# Patient Record
Sex: FEMALE | Race: WHITE | URBAN - METROPOLITAN AREA
[De-identification: names, ages, dates, MRNs, and addresses within clinical notes are randomized per-mention and may not be internally consistent; named-entity substitution may affect disease eponyms.]

---

## 2017-10-13 ENCOUNTER — OFFICE VISIT (OUTPATIENT)
Dept: ORTHOPEDIC SURGERY | Age: 52
End: 2017-10-13

## 2017-10-13 VITALS
HEART RATE: 90 BPM | BODY MASS INDEX: 33.83 KG/M2 | RESPIRATION RATE: 18 BRPM | HEIGHT: 59 IN | WEIGHT: 167.8 LBS | SYSTOLIC BLOOD PRESSURE: 161 MMHG | DIASTOLIC BLOOD PRESSURE: 109 MMHG

## 2017-10-13 DIAGNOSIS — M54.16 LUMBAR NEURITIS: ICD-10-CM

## 2017-10-13 DIAGNOSIS — M51.26 LUMBAR HERNIATED DISC: Primary | ICD-10-CM

## 2017-10-13 DIAGNOSIS — M51.36 OTHER INTERVERTEBRAL DISC DEGENERATION, LUMBAR REGION: ICD-10-CM

## 2017-10-13 RX ORDER — ALLOPURINOL 300 MG/1
TABLET ORAL
Refills: 6 | COMMUNITY
Start: 2017-09-01

## 2017-10-13 RX ORDER — CYCLOBENZAPRINE HCL 10 MG
TABLET ORAL
Refills: 1 | COMMUNITY
Start: 2017-09-01

## 2017-10-13 RX ORDER — SIMVASTATIN 20 MG/1
TABLET, FILM COATED ORAL
Refills: 4 | COMMUNITY
Start: 2017-09-01

## 2017-10-13 RX ORDER — HYDROCHLOROTHIAZIDE 25 MG/1
TABLET ORAL
Refills: 6 | COMMUNITY
Start: 2017-08-23

## 2017-10-13 RX ORDER — ALBUTEROL SULFATE 108 UG/1
AEROSOL, METERED RESPIRATORY (INHALATION)
Refills: 1 | COMMUNITY
Start: 2017-09-02

## 2017-10-13 RX ORDER — CITALOPRAM 40 MG/1
TABLET, FILM COATED ORAL
Refills: 3 | COMMUNITY
Start: 2017-09-01

## 2017-10-13 RX ORDER — GABAPENTIN 300 MG/1
CAPSULE ORAL
Refills: 1 | COMMUNITY
Start: 2017-09-18

## 2017-10-13 RX ORDER — TOPIRAMATE 25 MG/1
TABLET ORAL
Qty: 90 TAB | Refills: 1 | Status: SHIPPED | OUTPATIENT
Start: 2017-10-13

## 2017-10-13 RX ORDER — ALPRAZOLAM 0.5 MG/1
TABLET ORAL
Refills: 2 | COMMUNITY
Start: 2017-09-01

## 2017-10-13 RX ORDER — DICLOFENAC SODIUM 75 MG/1
TABLET, DELAYED RELEASE ORAL
Refills: 1 | COMMUNITY
Start: 2017-08-21

## 2017-10-13 RX ORDER — HYDROXYZINE PAMOATE 25 MG/1
CAPSULE ORAL
Refills: 0 | COMMUNITY
Start: 2017-08-14

## 2017-10-13 RX ORDER — CLONAZEPAM 1 MG/1
TABLET ORAL
Refills: 0 | COMMUNITY
Start: 2017-09-18

## 2017-10-13 RX ORDER — SUCRALFATE 1 G/1
TABLET ORAL
Refills: 6 | COMMUNITY
Start: 2017-09-06

## 2017-10-13 NOTE — MR AVS SNAPSHOT
Visit Information Date & Time Provider Department Dept. Phone Encounter #  
 10/13/2017  2:30 PM Pankaj Watts MD [facility] - [place] [id number] Follow-up Instructions Return for following MRI, following EMG. Upcoming Health Maintenance Date Due Hepatitis C Screening 1965 DTaP/Tdap/Td series (1 - Tdap) 10/26/1985 PAP AKA CERVICAL CYTOLOGY 10/26/1985 BREAST CANCER SCRN MAMMOGRAM 10/26/2014 FOBT Q 1 YEAR AGE 50-75 10/26/2014 INFLUENZA AGE 9 TO ADULT 8/1/2017 Allergies as of 10/13/2017  Review Complete On: 10/13/2017 By: Pankaj Watts MD  
 No Known Allergies Current Immunizations  Never Reviewed No immunizations on file. Not reviewed this visit You Were Diagnosed With   
  
 Codes Comments Lumbar herniated disc    -  Primary ICD-10-CM: M51.26 
ICD-9-CM: 722.10 Lumbar neuritis     ICD-10-CM: M54.16 
ICD-9-CM: 724.4 Other intervertebral disc degeneration, lumbar region     ICD-10-CM: M51.36 
ICD-9-CM: 722.52 Vitals BP Pulse Resp Height(growth percentile) Weight(growth percentile) BMI  
 (!) 161/109 (BP 1 Location: Right arm, BP Patient Position: Sitting) 90 18 4' 11\" (1.499 m) 167 lb 12.8 oz (76.1 kg) 33.89 kg/m2 Smoking Status Current Every Day Smoker Vitals History BMI and BSA Data Body Mass Index Body Surface Area  
 33.89 kg/m 2 1.78 m 2 Preferred Pharmacy Pharmacy Name Phone [facility] [address] [phone] Your Updated Medication List  
  
   
This list is accurate as of: 10/13/17  4:21 PM.  Always use your most recent med list.  
  
  
  
  
 allopurinol 300 mg tablet Commonly known as:  Ollen Epley TK 1 T PO QD  
  
 ALPRAZolam 0.5 mg tablet Commonly known as:  XANAX  
TAKE 1 TO 2 T PO TID PRN FOR 30 DAYS citalopram 40 mg tablet Commonly known as:  Lashon Mutters TK 1 T PO QD  
  
 clonazePAM 1 mg tablet Commonly known as:  Rhetta Napoleon TK 1 T PO Q 8 H PRN  
  
 cyclobenzaprine 10 mg tablet Commonly known as:  FLEXERIL TK 1 T PO Q 8 H PRF BACK PAIN  
  
 diclofenac EC 75 mg EC tablet Commonly known as:  VOLTAREN  
TK 1 T PO BID  
  
 gabapentin 300 mg capsule Commonly known as:  NEURONTIN  
TK 7 CS PO DAILY FOR 30 DAYS  
  
 hydroCHLOROthiazide 25 mg tablet Commonly known as:  HYDRODIURIL  
TAKE 1 T PO QD  
  
 hydrOXYzine pamoate 25 mg capsule Commonly known as:  VISTARIL TK 1 C PO Q 12 H PRF ANXIETY FOR 30 DAYS  
  
 PROVENTIL HFA 90 mcg/actuation inhaler Generic drug:  albuterol INHALE 2 PUFFS PO QID PRN  
  
 simvastatin 20 mg tablet Commonly known as:  ZOCOR TK 1 T PO QHS  
  
 sucralfate 1 gram tablet Commonly known as:  CARAFATE  
TAKE 1 T PO QID BEFORE MEALS AND AT BEDTIME  
  
 topiramate 25 mg tablet Commonly known as:  TOPAMAX 3 tabs PO QHS Prescriptions Sent to Pharmacy Refills  
 topiramate (TOPAMAX) 25 mg tablet 1 Sig: 3 tabs PO QHS Class: Normal  
 Pharmacy: [address] [place] [address], [address] [place] [address] #: [phone] Follow-up Instructions Return for following MRI, following EMG. To-Do List   
 10/20/2017 Neurology:  EMG TWO EXTREMITIES LOWER   
  
 10/20/2017 Imaging:  MRI LUMB SPINE WO CONT Introducing [facility]! Neida Chaudhary introduces Unbounce patient portal. Now you can access parts of your medical record, email your doctor's office, and request medication refills online. 1. In your internet browser, go to [email or web address] 2. Click on the First Time User? Click Here link in the Sign In box. You will see the New Member Sign Up page. 3. Enter your Unbounce Access Code exactly as it appears below.  You will not need to use this code after youve completed the sign-up process. If you do not sign up before the expiration date, you must request a new code. · [email or web address] Access Code: [facility] Expires: 12/13/2017  2:30 PM 
 
4. Enter the last four digits of your Social Security Number (xxxx) and Date of Birth (mm/dd/yyyy) as indicated and click Submit. You will be taken to the next sign-up page. 5. Create a Scoop.it ID. This will be your Scoop.it login ID and cannot be changed, so think of one that is secure and easy to remember. 6. Create a Scoop.it password. You can change your password at any time. 7. Enter your Password Reset Question and Answer. This can be used at a later time if you forget your password. 8. Enter your e-mail address. You will receive e-mail notification when new information is available in [address]. 9. Click Sign Up. You can now view and download portions of your medical record. 10. Click the Download Summary menu link to download a portable copy of your medical information. If you have questions, please visit the Frequently Asked Questions section of the [email or web address] website. Remember, Scoop.it is NOT to be used for urgent needs. For medical emergencies, dial 911. Now available from your iPhone and Android! Please provide this summary of care documentation to your next provider. Your primary care clinician is listed as Efrain Ta. If you have any questions after today's visit, please call [phone].

## 2017-10-13 NOTE — PROGRESS NOTES
[facility]  [address]  [address], [place]   Phone: [phone]  Fax: [phone]        INITIAL CONSULTATION      HISTORY OF PRESENT ILLNESS:  Dayna Gloria is a 45 y.o. female whom is referred from [facility], NP secondary to chronic progressive low back pain extending into the BLE (R=L) anteriorly to the feet, involving all digits with paraesthesias ongoing x 5+ years. Extremity symptoms are greater than low back pain. No specific injury/trauma. Pt reports bilateral foot drop and has had falls over the past six months. She rates pain 8/10. Note from [facility] dated 7/5/15 indicates patient was on Methadone. Note from Dr. Nisreen Petty DO for [facility] dated 5/4/15 indicates patient had tried injection therapy and medications x 6 months without benefit. Of note, he felt patient would be a good candidate for SCS trial. On 6/10/16, she underwent caudal block. Note from Dr. Nisreen Petty DO for [facility] dated 9/16/16 indicating patient underwent bilateral L5 SNRBs (provided relief x 3 weeks). Of note, she was on Fentanyl, Percocet, Neurontin and Flexeril. Reviewing records from [facility], it appears as though the patient underwent more than three lumbar blocks in a 12-month period. Pt previously took Lyrica without benefit. Pt is currently taking Neurontin 300 mg QID. She notes she is intolerant to higher doses of Neurontin secondary to BUE/BLE edema. Pt is not a candidate for Cymbalta due to other medications she is currently prescribed. Pt denies h/o lumbar spinal surgery. Per patient, a Dr. Lynsey Florez told her she was not a surgical candidate. Pt denies h/o DM or alcoholism. Lumbar spine XR dated 1/19/15 per report revealed degenerated L3-4 and L4-5 discs, with slight progression at L5-S1. Lower lumbar facet arthropathy with progression and with interval development of minimal grade 1 anterolisthesis at L4-5 on a degenerative basis. Lumbar spine MRI dated 8/20/16 reviewed.  Per report, minimal disc bulge and spondylosis L4-5, L5-S1. Minimal grade 1 degenerative spondylolisthesis, L4-5. Moderate asymmetric left L5-S1 foraminal stenosis from eccentric spondylosis and facet joint osteoarthritis with impingement on the left exiting L5 nerve root. Mild L4-5 central and mild right foraminal stenosis but no evident exiting L4 root impingement. Moderate bilateral L4-5 facet joint osteoarthritis with associated mild bilateral facet synovitis. Minimal L3-4, L4-5. Bastrup's disease/interspinous ligament degeneration and/or bursitis. The patient is RHD.  reviewed. Past Medical History:   Diagnosis Date    Asthma     Cancer [facility]) 2015    cervical cancer    Hypertension           Past Surgical History:   Procedure Laterality Date    HX GYN  2015    Cervical Cancer Removal       Social History   Substance Use Topics    Smoking status: Not on file    Smokeless tobacco: Not on file    Alcohol use Not on file     Work status: Not available. Marital status: The patient is legally . No Known Allergies     Family History   Problem Relation Age of Onset    No Known Problems Mother     Cancer Father      lymph node cancer    Hypertension Father          REVIEW OF SYSTEMS  Constitutional symptoms: Negative  Eyes: Negative  Ears, Nose, Throat, and Mouth: Negative  Cardiovascular: Negative  Respiratory: Negative  Genitourinary: Negative  Integumentary (Skin and/or breast): Negative  Musculoskeletal: Positive for low back pain into the BLE. Extremities: Negative for edema.   Endocrine/Rheumatologic: Negative  Hematologic/Lymphatic: Negative  Allergic/Immunologic: Negative  Psychiatric: Negative       PHYSICAL EXAMINATION    Visit Vitals    BP (!) 161/109 (BP 1 Location: Right arm, BP Patient Position: Sitting)    Pulse 90    Resp 18    Ht 4' 11\" (1.499 m)    Wt 167 lb 12.8 oz (76.1 kg)    BMI 33.89 kg/m2       CONSTITUTIONAL: NAD, A&O x 3  HEART: Regular rate and rhythm  ABDOMEN: Positive bowel sounds, soft, nontender, and nondistended  LUNGS: Clear to auscultation bilaterally. SKIN: No rashes noted. RANGE OF MOTION: The patient has full passive range of motion in all four extremities. SENSATION: Decreased sensation to light touch at left lateral thigh. Sensation to light touch otherwise intact. MOTOR:   Straight Leg Raise: Negative, bilateral  Bay: Negative, bilateral  Deep tendon reflexes are 2+ at the brachioradialis, biceps, and triceps. Deep tendon reflexes are 2+ at the knees and 0 at the ankles bilaterally. Shoulder AB/Flex Elbow Flex Wrist Ext Elbow Ext Wrist Flex Hand Intrin Tone   Right +4/5 +4/5 +4/5 +4/5 +4/5 +4/5 +4/5   Left +4/5 +4/5 +4/5 +4/5 +4/5 +4/5 +4/5              Hip Flex Knee Ext Knee Flex Ankle DF GTE Ankle PF Tone   Right +4/5 +4/5 +4/5 +4/5 +4/5 +4/5 +4/5   Left +4/5 +4/5 +4/5 +4/5 +4/5 +4/5 +4/5       ASSESSMENT   Diagnoses and all orders for this visit:    1. Lumbar herniated disc    2. Lumbar neuritis    3. Other intervertebral disc degeneration, lumbar region         IMPRESSIONS/RECOMMENDATIONS:  The patient presents for chronic progressive low back pain extending into the BLE. She notes she has had several falls over the past six months and describes bilateral footdrop. I will set her up for a new lumbar spine MRI. I advised patient to bring copies of films to next visit. I will also refer her to Dr. Duong Ward for an EMG of the BLE to r/o radiculopathy versus neuropathy. She will wean off Neurontin 300 mg QID. I will try her on Topamax 25 mg 3 tabs qhs ramped. The risks, benefits, and potential side effects of this medication were discussed. Patient understands and wishes to proceed. Patient advised to call the office if intolerant to new medication. I will see the patient back following diagnostic testing. Written by Ted Csoby, as dictated by Gómez Echevarria MD  I examined the patient, reviewed and agree with the note.

## 2017-10-17 ENCOUNTER — DOCUMENTATION ONLY (OUTPATIENT)
Dept: ORTHOPEDIC SURGERY | Age: 52
End: 2017-10-17

## 2017-10-17 NOTE — PROGRESS NOTES
EMG BLE is scheduled with Dr. Wright Lefort, [address] [place] [address], [address], [facility], [phone] on 10/27/17, arrive 11:00AM, test 11:30AM. No Medicare pre-authorization required. I have tried several times to reach MsMelva Huber Valverde but her voice mail is not set up. I have mailed the referral to MsMelva Huber Valverde with the above appointment information.

## 2017-10-18 ENCOUNTER — DOCUMENTATION ONLY (OUTPATIENT)
Dept: ORTHOPEDIC SURGERY | Age: 52
End: 2017-10-18

## 2017-12-11 ENCOUNTER — TELEPHONE (OUTPATIENT)
Dept: ORTHOPEDIC SURGERY | Age: 52
End: 2017-12-11

## 2017-12-11 DIAGNOSIS — M51.36 OTHER INTERVERTEBRAL DISC DEGENERATION, LUMBAR REGION: Primary | ICD-10-CM

## 2017-12-11 DIAGNOSIS — M51.26 LUMBAR HERNIATED DISC: ICD-10-CM

## 2017-12-11 DIAGNOSIS — M54.16 LUMBAR NEURITIS: ICD-10-CM

## 2017-12-11 NOTE — TELEPHONE ENCOUNTER
I will set her up for a new lumbar spine MRI. I advised patient to bring copies of films to next visit. I will also refer her to Dr. Raymond Lucero for an EMG of the BLE to r/o radiculopathy versus neuropathy. I will see the patient back following diagnostic testing.      Has she had the studies? If so, please bring a copy of the disc. She has failed Gabapentin and Topamax. He will discuss further medications at her FU apt.

## 2017-12-11 NOTE — TELEPHONE ENCOUNTER
Called and inquired about her side effects and patient states she has since stopped taking the Topamax and she last took it before the weekend. She states the side effects are still there just not as bad. Patient states she has muscle soreness due to the jerking from the medication. Patient states this has kicked her anxiety more into gear. Patient has had her EMG done and due to transportation but she is scheduled for her MRI on Thursday 12/14/17. Patient is inquiring if there is anything else she can be put on? Please advise.

## 2017-12-11 NOTE — TELEPHONE ENCOUNTER
Patient had back reaction to Topamax which started talking in sleep and jerking in sleep about about a week after the 4th. She has felt like she was falling off leland. Having halliculation also. On Mon Nov 27,2017 she started with severe nausea, diarrhea. She has Hx of Diverticulosis She decreased dosage and has stoppped it all together but still has slight jerking at night and some time in day. Her muscles still feel sore & weak especially in shlds. She cannot take this medication. Need for Dr. Rhoda Wagner to prescribe a different medication.   Please call patient back at [phone]

## 2017-12-12 NOTE — TELEPHONE ENCOUNTER
She is not a candidate for Cymbalta. We can try a low dose of Lyrica. 50 mg BID. Please see if she would like to try this.      Lyrica 50 mg BID, #60, 0 RF

## 2017-12-12 NOTE — TELEPHONE ENCOUNTER
Called and informed patient per the provider she is not a candidate for Cymbalta but we can try her on a low dose of Lyrica 50 mg BID. Patient states yes she is willing to try the Lyrica. I informed her that we will be doing a free trial of the medication and she would need to  a free trial card and her RX. I informed patient that the free trial would state take 1 pill TID but she needs to take 1 pill at night for 7 days and thereafter take 1 pill twice a day. Patient states she will not be able to pick the RX today but possible tomorrow because her vehicle is currently not working. I informed patient that RX would be ready for  whenever she was able to get to our office. I also informed patient that due to her insurance we may have to do a prior authorization. Patient verbalized understanding.

## 2017-12-13 RX ORDER — PREGABALIN 50 MG/1
50 CAPSULE ORAL 3 TIMES DAILY
Qty: 21 CAP | Refills: 0 | Status: SHIPPED | OUTPATIENT
Start: 2017-12-13 | End: 2018-02-20 | Stop reason: ALTCHOICE

## 2017-12-13 RX ORDER — PREGABALIN 50 MG/1
50 CAPSULE ORAL 2 TIMES DAILY
Qty: 60 CAP | Refills: 0 | Status: SHIPPED | OUTPATIENT
Start: 2017-12-13 | End: 2018-01-17 | Stop reason: SDUPTHER

## 2017-12-19 DIAGNOSIS — M54.16 LUMBAR NEURITIS: ICD-10-CM

## 2017-12-19 DIAGNOSIS — M51.26 LUMBAR HERNIATED DISC: ICD-10-CM

## 2017-12-19 DIAGNOSIS — M51.36 OTHER INTERVERTEBRAL DISC DEGENERATION, LUMBAR REGION: ICD-10-CM

## 2018-01-17 DIAGNOSIS — M54.16 LUMBAR NEURITIS: ICD-10-CM

## 2018-01-17 DIAGNOSIS — M51.36 OTHER INTERVERTEBRAL DISC DEGENERATION, LUMBAR REGION: ICD-10-CM

## 2018-01-17 DIAGNOSIS — M51.26 LUMBAR HERNIATED DISC: ICD-10-CM

## 2018-01-17 RX ORDER — PREGABALIN 50 MG/1
50 CAPSULE ORAL 2 TIMES DAILY
Qty: 60 CAP | Refills: 0 | OUTPATIENT
Start: 2018-01-17

## 2018-01-17 NOTE — TELEPHONE ENCOUNTER
PHONE IN RX    Last Visit: 10/13/2017 with MD Janie Guerrier    Next Appointment: 01/26/2018 with MD Janie Guerrier; Pt cancele 01/03  Previous Refill Encounters: 12/13/2017 per NP [place] #[id number]    Requested Prescriptions     Pending Prescriptions Disp Refills    pregabalin (LYRICA) 50 mg capsule 60 Cap 0     Sig: Take 1 Cap by mouth two (2) times a day. Max Daily Amount: 100 mg.

## 2018-01-22 NOTE — TELEPHONE ENCOUNTER
Medication has been called into the pharmacy. Called to inform the patient but did not get an answer. Left a VM stating that the medication has called in.

## 2018-02-20 ENCOUNTER — OFFICE VISIT (OUTPATIENT)
Dept: ORTHOPEDIC SURGERY | Age: 53
End: 2018-02-20

## 2018-02-20 VITALS
HEART RATE: 68 BPM | WEIGHT: 178 LBS | BODY MASS INDEX: 35.88 KG/M2 | HEIGHT: 59 IN | DIASTOLIC BLOOD PRESSURE: 96 MMHG | RESPIRATION RATE: 16 BRPM | SYSTOLIC BLOOD PRESSURE: 139 MMHG

## 2018-02-20 DIAGNOSIS — M54.16 LUMBAR RADICULOPATHY: ICD-10-CM

## 2018-02-20 DIAGNOSIS — M51.36 OTHER INTERVERTEBRAL DISC DEGENERATION, LUMBAR REGION: Primary | ICD-10-CM

## 2018-02-20 DIAGNOSIS — M51.26 LUMBAR HERNIATED DISC: Primary | ICD-10-CM

## 2018-02-20 RX ORDER — DIAZEPAM 10 MG/1
TABLET ORAL
Qty: 1 TAB | Refills: 0 | OUTPATIENT
Start: 2018-02-20 | End: 2018-05-22 | Stop reason: ALTCHOICE

## 2018-02-20 RX ORDER — PREGABALIN 75 MG/1
75 CAPSULE ORAL 2 TIMES DAILY
Qty: 60 CAP | Refills: 1 | Status: SHIPPED | OUTPATIENT
Start: 2018-02-20

## 2018-02-20 NOTE — PROGRESS NOTES
[facility]  [address], [place]   Phone: [phone]  Fax: [phone]        PROGRESS NOTE      HISTORY OF PRESENT ILLNESS:  The patient is a 47 y.o. female and was seen today for follow up of chronic progressive low back pain extending into the BLE (R=L) anteriorly to the feet, involving all digits with paraesthesias ongoing x 5+ years. Extremity symptoms are greater than low back pain. No specific injury/trauma. Pt reports bilateral foot drop and has had falls over the past six months. Note from [facility] dated 7/5/15 indicates patient was on Methadone. Note from Dr. Abilio Arana DO for [facility] dated 5/4/15 indicates patient had tried injection therapy and medications x 6 months without benefit. Of note, he felt patient would be a good candidate for SCS trial. On 6/10/16, she underwent caudal block. Note from Dr. Abilio Arana DO for [facility] dated 9/16/16 indicating patient underwent bilateral L5 SNRBs (provided relief x 3 weeks). Of note, she was on Fentanyl, Percocet, Neurontin and Flexeril. Reviewing records from [facility], it appears as though the patient underwent more than three lumbar blocks in a 12-month period. Pt previously took Lyrica without benefit. Pt is currently taking Neurontin 300 mg QID. She notes she is intolerant to higher doses of Neurontin secondary to BUE/BLE edema. Pt is not a candidate for Cymbalta due to other medications she is currently prescribed. Pt denies h/o lumbar spinal surgery. Per patient, a Dr. More Call told her she was not a surgical candidate. Pt denies h/o DM or alcoholism. Lumbar spine XR dated 1/19/15 per report revealed degenerated L3-4 and L4-5 discs, with slight progression at L5-S1. Lower lumbar facet arthropathy with progression and with interval development of minimal grade 1 anterolisthesis at L4-5 on a degenerative basis. Lumbar spine MRI dated 8/20/16 reviewed. Per report, minimal disc bulge and spondylosis L4-5, L5-S1. Minimal grade 1 degenerative spondylolisthesis, L4-5. Moderate asymmetric left L5-S1 foraminal stenosis from eccentric spondylosis and facet joint osteoarthritis with impingement on the left exiting L5 nerve root. Mild L4-5 central and mild right foraminal stenosis but no evident exiting L4 root impingement. Moderate bilateral L4-5 facet joint osteoarthritis with associated mild bilateral facet synovitis. Minimal L3-4, L4-5. Bastrup's disease/interspinous ligament degeneration and/or bursitis. The patient is RHD. At her last clinic appointment, the patient presented for chronic progressive low back pain extending into the BLE. She noted she has had several falls over the past six months and described bilateral foot drop. She was set up for a new lumbar spine MRI. I advised patient to bring copies of films to next visit. I referred her to Dr. Megan Grace for an EMG of the BLE to r/o radiculopathy versus neuropathy. She weaned off Neurontin 300 mg QID. I tried her on Topamax 25 mg 3 tabs qhs ramped. I will see the patient back following diagnostic testing. The patient returns today with low back pain extending into the BLE circumferentially to the feet, with pain mostly localized on her bilateral knees (R=L). She rates pain 10/10, an increase since her last visit (8/10). Per our records, pt did not tolerate TOPAMAX and subsequently she d/c the medication and was started on Lyrica 50 mg BID on 12/12/17, which she tolerates well with some benefit but she stopped taking it. Her last block was performed roughly 1.5 years ago by Dr. Jaci Garrison. BLE EMG dated 10/27/17 reviewed. Report was essentially within the normal limits. Lumbar spine MRI dated 2/13/18 reviewed. Per report, stable MRI of the lumbar spine since prior ecam 8-2016. Mild L4-5 central stenosis, from grade 1 degenerative spondylolisthesis with moderate bilateral facet joint ostearthritis and ligamentum flavlyn thickening, minimal disc bulge.  Eccentric and moderate right L5-S1 foraminal stenosis from minimal disc bulge with eccentric left lateral spondylosis and mild asymmetric right facet joint ostearthritis. Associated impingement on the left existing L5 nerve root. Minimal L4-5 and L5-S1 disc bulges. Multilevel lower lumbar facet joint ostearthritis as above. Finding consistent with mild L3-4, L4-5 Bartrup's disease/interspinous ligament degenerative and or bursitis.  reviewed. Body mass index is 35.95 kg/(m^2). Past Medical History:   Diagnosis Date    Asthma     Cancer [facility]) 2015    cervical cancer    Hypertension         Social History     Social History    Marital status: LEGALLY      Spouse name: N/A    Number of children: N/A    Years of education: N/A     Occupational History    Not on file. Social History Main Topics    Smoking status: Current Every Day Smoker     Packs/day: 1.00    Smokeless tobacco: Never Used    Alcohol use No    Drug use: Not on file    Sexual activity: Not on file     Other Topics Concern    Not on file     Social History Narrative       Current Outpatient Prescriptions   Medication Sig Dispense Refill    pregabalin (LYRICA) 75 mg capsule Take 1 Cap by mouth two (2) times a day. Max Daily Amount: 150 mg. 60 Cap 1    pregabalin (LYRICA) 50 mg capsule Take 1 Cap by mouth two (2) times a day.  Max Daily Amount: 100 mg. 60 Cap 0    PROVENTIL HFA 90 mcg/actuation inhaler INHALE 2 PUFFS PO QID PRN  1    allopurinol (ZYLOPRIM) 300 mg tablet TK 1 T PO QD  6    ALPRAZolam (XANAX) 0.5 mg tablet TAKE 1 TO 2 T PO TID PRN FOR 30 DAYS  2    citalopram (CELEXA) 40 mg tablet TK 1 T PO QD  3    cyclobenzaprine (FLEXERIL) 10 mg tablet TK 1 T PO Q 8 H PRF BACK PAIN  1    hydroCHLOROthiazide (HYDRODIURIL) 25 mg tablet TAKE 1 T PO QD  6    simvastatin (ZOCOR) 20 mg tablet TK 1 T PO QHS  4    diazePAM (VALIUM) 10 mg tablet Take 1 tab by mouth as directed by nurse prior to procedure 1 Tab 0    clonazePAM (KLONOPIN) 1 mg tablet TK 1 T PO Q 8 H PRN  0    diclofenac EC (VOLTAREN) 75 mg EC tablet TK 1 T PO BID  1    gabapentin (NEURONTIN) 300 mg capsule TK 7 CS PO DAILY FOR 30 DAYS  1    hydrOXYzine pamoate (VISTARIL) 25 mg capsule TK 1 C PO Q 12 H PRF ANXIETY FOR 30 DAYS  0    sucralfate (CARAFATE) 1 gram tablet TAKE 1 T PO QID BEFORE MEALS AND AT BEDTIME  6    topiramate (TOPAMAX) 25 mg tablet 3 tabs PO QHS (Patient not taking: Reported on 2/20/2018) 90 Tab 1       No Known Allergies       PHYSICAL EXAMINATION    Visit Vitals    BP (!) 139/96    Pulse 68    Resp 16    Ht 4' 11\" (1.499 m)    Wt 178 lb (80.7 kg)    BMI 35.95 kg/m2       CONSTITUTIONAL: NAD, A&O x 3  SENSATION: Decreased sensation to light touch bellow the knee posteriorly in the LLE in an area just bellow the ankle. Sensation to light touch otherwise intact. RANGE OF MOTION: The patient has full passive range of motion in all four extremities. MOTOR:  Straight Leg Raise: Negative, bilateral     Required couching to get maximum effort. Hip Flex Knee Ext Knee Flex Ankle DF GTE Ankle PF Tone   Right +4/5 +4/5 +4/5 +4/5 +4/5 +4/5 +4/5   Left +4/5 +4/5 +4/5 +4/5 +4/5 +4/5 +4/5       ASSESSMENT   Diagnoses and all orders for this visit:    1. Other intervertebral disc degeneration, lumbar region  -     REFERRAL TO ORTHOPEDICS  -     SCHEDULE SURGERY  -     pregabalin (LYRICA) 75 mg capsule; Take 1 Cap by mouth two (2) times a day. Max Daily Amount: 150 mg.    2. Lumbar radiculopathy  -     REFERRAL TO ORTHOPEDICS  -     SCHEDULE SURGERY  -     pregabalin (LYRICA) 75 mg capsule; Take 1 Cap by mouth two (2) times a day. Max Daily Amount: 150 mg.          IMPRESSION AND PLAN:  Patient is neurologically intact. I will try her on Lyrica 75 mg BID. The risks, benefits, and potential side effects of this medication were discussed. Patient understands and wishes to proceed. Patient advised to call the office if intolerant to new medication. I will refer her to see Dr. Heron Lagos to evaluate her bilateral knee complaints. I will order bilateral L4/5 facet joint injection. I will see the patient back following block    Written by Yoel Berg, as dictated by Maura Tobar MD  I examined the patient, reviewed and agree with the note.

## 2018-02-23 ENCOUNTER — TELEPHONE (OUTPATIENT)
Dept: ORTHOPEDIC SURGERY | Age: 53
End: 2018-02-23

## 2018-02-23 NOTE — TELEPHONE ENCOUNTER
Called [phone] and was unable to leave a voice mail due to it saying Jimmy Founds and they are not on her [de-identified]. I was calling to inform her of the message below per the provider.

## 2018-02-23 NOTE — TELEPHONE ENCOUNTER
Called and informed patient per the provider of the below message and she stated she has had to do a lot more walking because her car is broke down and with all the walking she is in a lot of pain. I again repeated that per the provider Dr. Dorina Boateng had just increased her medication a few days ago and it takes longer than than a week to become effective. And we have to make sure she can tolerate the dose prior to increasing. Patient is scheduled for her injection on 3/6/18 as long as here ride comes through. I informed patient that she could take OTC Aleve or Motrin to see if this would help calm the pain. Patient verbalized understanding.

## 2018-02-23 NOTE — TELEPHONE ENCOUNTER
He just up'd the medication a few days ago. The medication may take a little longer ( a week or so) to be come effective. We want to make sure she tolerates the dose prior to increasing it. Please make sure the block has been scheduled. She is not a candidate for Cymbalta. I would recommend OTC Aleve or Motrin to see if this will help calm the pain.
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
## 2018-03-08 DIAGNOSIS — M51.36 OTHER INTERVERTEBRAL DISC DEGENERATION, LUMBAR REGION: ICD-10-CM

## 2018-03-08 DIAGNOSIS — M51.26 LUMBAR HERNIATED DISC: ICD-10-CM

## 2018-03-08 DIAGNOSIS — M54.16 LUMBAR NEURITIS: ICD-10-CM

## 2018-03-26 ENCOUNTER — TELEPHONE (OUTPATIENT)
Dept: ORTHOPEDIC SURGERY | Age: 53
End: 2018-03-26
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
## 2018-03-26 NOTE — TELEPHONE ENCOUNTER
She was scheduled for an injection on 3/6. I do not see this in CC. If she missed this, is she ready to have it rescheduled? Please ensure the pain distribution has not changed. She was scheduled for a L4/5 bilateral facet joint injection.

## 2018-03-27 NOTE — TELEPHONE ENCOUNTER
Called and left voice message asking patient to call our office back to inquire per the provider in the below message. To verify where the patient is having pain.

## 2018-03-29 NOTE — TELEPHONE ENCOUNTER
She has now failed Cymbalta, Lyrica, Topamax and Gabapentin. Gabitril will likely be just as expensive. I would recommend she reschedule the block.
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
## 2018-03-30 NOTE — TELEPHONE ENCOUNTER
Called and informed patient of the below message and she states she would like to try the Gabitril because she does not feel the Lyrica is helping and it is expensive. Patient states she has 5 more days left and she will get the rest before she runs out. Patient states she is thinking about not getting the injection because she has had 12 injections and they only last 2 weeks. Patient states she uses [facility]. I did inform patient that she would have to wean off the Lyrica first before she starts the new medication. Please advise.

## 2018-04-02 NOTE — TELEPHONE ENCOUNTER
Attempted to call back. .. Did not leave message. VM has different persons name on it. Will attempt 2nd try tomorrow. 2nd attempt made. Same result. 3rd attempt made. Same result. Closing message. Pt has no f/u scheduled with NLP.

## 2018-04-02 NOTE — TELEPHONE ENCOUNTER
After reviewing the chart, She no showed in March. Please have her make a FU (can be with NP). Please give her instructions to wean off Lyrica. She is taking this BID now. She should cut down to daily for 4-5 days and then stop. We can discuss the next medication (which is likely Gabitril) at the follow up.

## 2018-04-10 ENCOUNTER — OFFICE VISIT (OUTPATIENT)
Dept: ORTHOPEDIC SURGERY | Age: 53
End: 2018-04-10

## 2018-04-10 VITALS
TEMPERATURE: 98 F | WEIGHT: 170 LBS | SYSTOLIC BLOOD PRESSURE: 145 MMHG | BODY MASS INDEX: 34.27 KG/M2 | HEIGHT: 59 IN | OXYGEN SATURATION: 98 % | DIASTOLIC BLOOD PRESSURE: 98 MMHG | HEART RATE: 75 BPM

## 2018-04-10 DIAGNOSIS — M53.3 COCCYDYNIA: ICD-10-CM

## 2018-04-10 DIAGNOSIS — M25.562 PAIN IN BOTH KNEES, UNSPECIFIED CHRONICITY: Primary | ICD-10-CM

## 2018-04-10 DIAGNOSIS — M53.3 PAIN, COCCYX: ICD-10-CM

## 2018-04-10 DIAGNOSIS — M21.371 FOOT DROP, RIGHT: ICD-10-CM

## 2018-04-10 DIAGNOSIS — M25.561 PAIN IN BOTH KNEES, UNSPECIFIED CHRONICITY: Primary | ICD-10-CM

## 2018-04-10 DIAGNOSIS — M17.0 ARTHRITIS OF BOTH KNEES: ICD-10-CM

## 2018-04-10 NOTE — MR AVS SNAPSHOT
[facility] 
 
 
 [address] [facility] 
[phone] Patient: Zulma Waldron MRN: [id number] :1965 Visit Information Date & Time Provider Department Dept. Phone Encounter #  
 4/10/2018  2:00 PM Yue Malin  [facility], [address] and [facility] [place] [address] [phone] [id number] Upcoming Health Maintenance Date Due Hepatitis C Screening 1965 Pneumococcal 19-64 Medium Risk (1 of 1 - PPSV23) 10/26/1983 DTaP/Tdap/Td series (1 - Tdap) 10/26/1985 PAP AKA CERVICAL CYTOLOGY 10/26/1985 BREAST CANCER SCRN MAMMOGRAM 10/26/2014 FOBT Q 1 YEAR AGE 50-75 10/26/2014 Influenza Age 5 to Adult 2017 MEDICARE YEARLY EXAM 3/14/2018 Allergies as of 4/10/2018  Review Complete On: 4/10/2018 By: Rosetta Jackson LPN No Known Allergies Current Immunizations  Never Reviewed No immunizations on file. Not reviewed this visit You Were Diagnosed With   
  
 Codes Comments Pain in both knees, unspecified chronicity    -  Primary ICD-10-CM: M25.561, M25.562 ICD-9-CM: 719.46 Pain, coccyx     ICD-10-CM: M53.3 ICD-9-CM: 724.79 Vitals BP Pulse Temp Height(growth percentile) Weight(growth percentile) SpO2  
 (!) 145/98 75 98 °F (36.7 °C) (Oral) 4' 11\" (1.499 m) 170 lb (77.1 kg) 98% BMI Smoking Status 34.34 kg/m2 Current Every Day Smoker BMI and BSA Data Body Mass Index Body Surface Area  
 34.34 kg/m 2 1.79 m 2 Preferred Pharmacy Pharmacy Name Phone [address] [facility], [address] Your Updated Medication List  
  
   
This list is accurate as of 4/10/18  2:18 PM.  Always use your most recent med list.  
  
  
  
  
 allopurinol 300 mg tablet Commonly known as:  Shelvy Nunam Iqua TK 1 T PO QD  
  
 ALPRAZolam 0.5 mg tablet Commonly known as:  XANAX  
TAKE 1 TO 2 T PO TID PRN FOR 30 DAYS citalopram 40 mg tablet Commonly known as:  Judythe Grade TK 1 T PO QD  
  
 clonazePAM 1 mg tablet Commonly known as:  Evlyn Erick TK 1 T PO Q 8 H PRN  
  
 cyclobenzaprine 10 mg tablet Commonly known as:  FLEXERIL TK 1 T PO Q 8 H PRF BACK PAIN  
  
 diazePAM 10 mg tablet Commonly known as:  VALIUM Take 1 tab by mouth as directed by nurse prior to procedure  
  
 diclofenac EC 75 mg EC tablet Commonly known as:  VOLTAREN  
TK 1 T PO BID  
  
 gabapentin 300 mg capsule Commonly known as:  NEURONTIN  
TK 7 CS PO DAILY FOR 30 DAYS  
  
 hydroCHLOROthiazide 25 mg tablet Commonly known as:  HYDRODIURIL  
TAKE 1 T PO QD  
  
 hydrOXYzine pamoate 25 mg capsule Commonly known as:  VISTARIL TK 1 C PO Q 12 H PRF ANXIETY FOR 30 DAYS * pregabalin 50 mg capsule Commonly known as:  Garlin Chapel Take 1 Cap by mouth two (2) times a day. Max Daily Amount: 100 mg.  
  
 * pregabalin 75 mg capsule Commonly known as:  Garlin Chapel Take 1 Cap by mouth two (2) times a day. Max Daily Amount: 150 mg. PROVENTIL HFA 90 mcg/actuation inhaler Generic drug:  albuterol INHALE 2 PUFFS PO QID PRN  
  
 simvastatin 20 mg tablet Commonly known as:  ZOCOR TK 1 T PO QHS  
  
 sucralfate 1 gram tablet Commonly known as:  CARAFATE  
TAKE 1 T PO QID BEFORE MEALS AND AT BEDTIME  
  
 topiramate 25 mg tablet Commonly known as:  TOPAMAX 3 tabs PO QHS * Notice: This list has 2 medication(s) that are the same as other medications prescribed for you. Read the directions carefully, and ask your doctor or other care provider to review them with you. We Performed the Following AMB POC XRAY, KNEE; 1/2 VIEWS [04262 CPT(R)] AMB POC XRAY, KNEE; 1/2 VIEWS [88487 CPT(R)] AMB POC XRAY, SACRUM & COCCYX, 2+ VIE [15562 CPT(R)] Introducing [facility] & HEALTH SERVICES!    
 Bennie Rollins introduces Nova Ratio patient portal. Now you can access parts of your medical record, email your doctor's office, and request medication refills online. 1. In your internet browser, go to [email or web address] 2. Click on the First Time User? Click Here link in the Sign In box. You will see the New Member Sign Up page. 3. Enter your Nexgate Access Code exactly as it appears below. You will not need to use this code after youve completed the sign-up process. If you do not sign up before the expiration date, you must request a new code. · Nexgate Access Code: [email or web address] Expires: 5/21/2018  9:44 AM 
 
4. Enter the last four digits of your Social Security Number (xxxx) and Date of Birth (mm/dd/yyyy) as indicated and click Submit. You will be taken to the next sign-up page. 5. Create a Nexgate ID. This will be your Nexgate login ID and cannot be changed, so think of one that is secure and easy to remember. 6. Create a Nexgate password. You can change your password at any time. 7. Enter your Password Reset Question and Answer. This can be used at a later time if you forget your password. 8. Enter your e-mail address. You will receive e-mail notification when new information is available in [address] 9. Click Sign Up. You can now view and download portions of your medical record. 10. Click the Download Summary menu link to download a portable copy of your medical information. If you have questions, please visit the Frequently Asked Questions section of the Nexgate website. Remember, Nexgate is NOT to be used for urgent needs. For medical emergencies, dial 911. Now available from your iPhone and Android! Please provide this summary of care documentation to your next provider. Your primary care clinician is listed as Praveena Rogers. If you have any questions after today's visit, please call [phone].

## 2018-04-10 NOTE — PROGRESS NOTES
HISTORY OF PRESENT ILLNESS: Ms. Jeromy Allen is here for consultation regarding bilateral knee pain. Her main complaint however is pain in the coccyx. She fell recently because of weakness in the right foot. She has been falling a lot, almost on a weekly basis. She fell back on her buttock and now she complains of pain in her tailbone. This is bothering her much more today than her knees do. She has been on Lyrica in the past but took herself off of it. She is going to be starting nerve pain in the near future. Gabapentin did not work for her. She does not wear a splint on the right foot. She does not wear braces on her knees. She has not had injections to her knees. She states her right knee is worse than her left clinically. PHYSICAL EXAMINATION:  Reveals an overweight, 50-year-old female in discomfort. She is more in discomfort however because of her coccyx pain. She sits forward in her chair. With reference to her right knee, she does not have an obvious deformity of the right knee. She has good range of motion of the right knee from full extension to flexion of about 120º. She does not have palpable medial or lateral joint line tenderness. She has mild crepitus in the patellofemoral area of the right knee with flexion and extension. Randys test is negative. Anterior pivot shift test is negative. Lachman test is negative. She has good collateral, as well as cruciate ligament stability to her right knee. She has no right calf tenderness or swelling. Neurovascular testing in the right foot reveals some slight weakness of dorsiflexion in the right foot compared to the left, but this is a little bit due to voluntary effort also. With reference to her left knee, she does have a genu varus deformity of the left knee which is almost correctable in neutral position passively. She has slight palpable medial joint line tenderness.   She has no palpable lateral joint line tenderness. She has crepitus in her patellofemoral area of her left knee with flexion and extension. She has good collateral as well as cruciate ligament stability of the left knee. Lachman test is negative. Anterior pivot shift test is negative. Anterior and posterior drawer tests are negative. She has no left calf tenderness or swelling. Neurovascular testing is intact to the left foot distally. RADIOGRAPHS:  X-rays of both knees taken recently reveal osteoarthritis of both knees. Her left knee is worse than her right. She has about 50% narrowing of the joint space on the right knee in the medial compartment. She has a slight radiographic genu varus deformity of the right knee. With reference to her left knee, she is getting close to bone-on-bone opposition in the medial compartment of her left knee on standing AP radiographs. She has a radiographic genu varus deformity. She has minimal spurring along the medial femoral condyle and medial tibial plateau. There is increased sclerosis along the medial compartment. An x-ray of her coccyx is obtained today and reveals a questionable fracture about 2 cm up from the tip of the coccyx although this alignment appears satisfactory. IMPRESSION:   1. Osteoarthritis, both knees, right knee clinically worse than left. 2. Coccydynia. RECOMMENDATIONS:  At this point her coccyx is bothering her the most.  I have discussed with her sitting on a donut. In addition I am more concerned about the fact that she has been falling a lot and I do not want her to break a wrist or hip or something when she is falling. I have ordered an AFO splint for the right foot even though she does not have a complete footdrop she does state she drags this foot after she has been walking for a while. She should utilize a cane for ambulation assistance.   With reference to her knees at this point we will defer treatment for that until she is feeling better from the coccyx pain. She agrees that she wants to get her coccyx pain better first.  All of her questions were answered today. Vitals:    04/10/18 1324   BP: (!) 145/98   Pulse: 75   Temp: 98 °F (36.7 °C)   TempSrc: Oral   SpO2: 98%   Weight: 170 lb (77.1 kg)   Height: 4' 11\" (1.499 m)   PainSc:  10 - Worst pain ever   PainLoc: Knee       Patient Active Problem List   Diagnosis Code    Lumbar herniated disc M51.26    Lumbar neuritis M54.16    Other intervertebral disc degeneration, lumbar region M51.36     Patient Active Problem List    Diagnosis Date Noted    Lumbar herniated disc 10/13/2017    Lumbar neuritis 10/13/2017    Other intervertebral disc degeneration, lumbar region 10/13/2017     Current Outpatient Prescriptions   Medication Sig Dispense Refill    pregabalin (LYRICA) 75 mg capsule Take 1 Cap by mouth two (2) times a day. Max Daily Amount: 150 mg. 60 Cap 1    pregabalin (LYRICA) 50 mg capsule Take 1 Cap by mouth two (2) times a day.  Max Daily Amount: 100 mg. 60 Cap 0    PROVENTIL HFA 90 mcg/actuation inhaler INHALE 2 PUFFS PO QID PRN  1    allopurinol (ZYLOPRIM) 300 mg tablet TK 1 T PO QD  6    ALPRAZolam (XANAX) 0.5 mg tablet TAKE 1 TO 2 T PO TID PRN FOR 30 DAYS  2    citalopram (CELEXA) 40 mg tablet TK 1 T PO QD  3    cyclobenzaprine (FLEXERIL) 10 mg tablet TK 1 T PO Q 8 H PRF BACK PAIN  1    hydroCHLOROthiazide (HYDRODIURIL) 25 mg tablet TAKE 1 T PO QD  6    hydrOXYzine pamoate (VISTARIL) 25 mg capsule TK 1 C PO Q 12 H PRF ANXIETY FOR 30 DAYS  0    simvastatin (ZOCOR) 20 mg tablet TK 1 T PO QHS  4    sucralfate (CARAFATE) 1 gram tablet TAKE 1 T PO QID BEFORE MEALS AND AT BEDTIME  6    topiramate (TOPAMAX) 25 mg tablet 3 tabs PO QHS 90 Tab 1    diazePAM (VALIUM) 10 mg tablet Take 1 tab by mouth as directed by nurse prior to procedure 1 Tab 0    clonazePAM (KLONOPIN) 1 mg tablet TK 1 T PO Q 8 H PRN  0    diclofenac EC (VOLTAREN) 75 mg EC tablet TK 1 T PO BID  1    gabapentin (NEURONTIN) 300 mg capsule TK 7 CS PO DAILY FOR 30 DAYS  1     No Known Allergies  Past Medical History:   Diagnosis Date    Asthma     Cancer ([facility] [address] 2015    cervical cancer    Hypertension      Past Surgical History:   Procedure Laterality Date    HX GYN  2015    Cervical Cancer Removal     Family History   Problem Relation Age of Onset    No Known Problems Mother     Cancer Father      lymph node cancer    Hypertension Father      Social History   Substance Use Topics    Smoking status: Current Every Day Smoker     Packs/day: 1.00    Smokeless tobacco: Never Used    Alcohol use No

## 2018-04-13 DIAGNOSIS — M54.16 LUMBAR RADICULOPATHY: ICD-10-CM

## 2018-04-13 DIAGNOSIS — M51.36 OTHER INTERVERTEBRAL DISC DEGENERATION, LUMBAR REGION: ICD-10-CM

## 2018-04-13 NOTE — TELEPHONE ENCOUNTER
PHONE IN RX    Date of Block: 02/27/2018 Facet Bilateril L4/5   Last Visit: 02/20/2018 with MD Hortensia Velasquez    Next Appointment: noted to f/u after block; No show 03/13  Previous Refill Encounters: 02/20/2018 per MD Hortensia Velasquez #[id number] with 1 refill     Requested Prescriptions     Pending Prescriptions Disp Refills    pregabalin (LYRICA) 75 mg capsule 60 Cap 0     Sig: Take 1 Cap by mouth two (2) times a day. Max Daily Amount: 150 mg.

## 2018-04-16 RX ORDER — PREGABALIN 75 MG/1
75 CAPSULE ORAL 2 TIMES DAILY
Qty: 60 CAP | Refills: 0 | OUTPATIENT
Start: 2018-04-16

## 2018-04-16 NOTE — TELEPHONE ENCOUNTER
Please contact the patient for scheduling per refusal reason below. Thanks.      Refused by: Ky Olivares NP  Refusal reason: Appt required, please call patient (no show 3/13)

## 2018-05-22 ENCOUNTER — OFFICE VISIT (OUTPATIENT)
Dept: ORTHOPEDIC SURGERY | Age: 53
End: 2018-05-22

## 2018-05-22 VITALS
HEIGHT: 59 IN | WEIGHT: 170 LBS | DIASTOLIC BLOOD PRESSURE: 103 MMHG | BODY MASS INDEX: 34.27 KG/M2 | SYSTOLIC BLOOD PRESSURE: 147 MMHG | HEART RATE: 87 BPM

## 2018-05-22 DIAGNOSIS — M51.36 OTHER INTERVERTEBRAL DISC DEGENERATION, LUMBAR REGION: ICD-10-CM

## 2018-05-22 DIAGNOSIS — M54.16 LUMBAR NEURITIS: Primary | ICD-10-CM

## 2018-05-22 NOTE — MR AVS SNAPSHOT
[address] [facility] 
 
 
 [address] [facility] 
[phone] Patient: Gisela Saunders MRN: [id number] :1965 Visit Information Date & Time Provider Department Dept. Phone Encounter #  
 2018  9:50 AM Alonzo Gottron, MD [address] [facility], [address] and [facility] [place] [phone] [id number] Follow-up Instructions Return if symptoms worsen or fail to improve. Upcoming Health Maintenance Date Due Hepatitis C Screening 1965 Pneumococcal 19-64 Medium Risk (1 of 1 - PPSV23) 10/26/1983 DTaP/Tdap/Td series (1 - Tdap) 10/26/1985 PAP AKA CERVICAL CYTOLOGY 10/26/1985 BREAST CANCER SCRN MAMMOGRAM 10/26/2014 FOBT Q 1 YEAR AGE 50-75 10/26/2014 MEDICARE YEARLY EXAM 3/14/2018 Influenza Age 5 to Adult 2018 Allergies as of 2018  Review Complete On: 2018 By: Alonzo Gottron, MD  
 No Known Allergies Current Immunizations  Never Reviewed No immunizations on file. Not reviewed this visit You Were Diagnosed With   
  
 Codes Comments Lumbar neuritis    -  Primary ICD-10-CM: M54.16 
ICD-9-CM: 724.4 Other intervertebral disc degeneration, lumbar region     ICD-10-CM: M51.36 
ICD-9-CM: 722.52 Vitals BP Pulse Height(growth percentile) Weight(growth percentile) BMI Smoking Status (!) 147/103 87 4' 11\" (1.499 m) 170 lb (77.1 kg) 34.34 kg/m2 Current Every Day Smoker Vitals History BMI and BSA Data Body Mass Index Body Surface Area  
 34.34 kg/m 2 1.79 m 2 Preferred Pharmacy Pharmacy Name Phone [address] [facility], [address] [address] Your Updated Medication List  
  
   
This list is accurate as of 18 10:25 AM.  Always use your most recent med list.  
  
  
  
  
 allopurinol 300 mg tablet Commonly known as:  Reece Riedel TK 1 T PO QD  
  
 ALPRAZolam 0.5 mg tablet Commonly known as:  Светлана Elsa TAKE 1 TO 2 T PO TID PRN FOR 30 DAYS  
  
 citalopram 40 mg tablet Commonly known as:  Monna Roch TK 1 T PO QD  
  
 clonazePAM 1 mg tablet Commonly known as:  Cyrilla Mayans TK 1 T PO Q 8 H PRN  
  
 cyclobenzaprine 10 mg tablet Commonly known as:  FLEXERIL TK 1 T PO Q 8 H PRF BACK PAIN  
  
 diclofenac EC 75 mg EC tablet Commonly known as:  VOLTAREN  
TK 1 T PO BID  
  
 gabapentin 300 mg capsule Commonly known as:  NEURONTIN  
TK 7 CS PO DAILY FOR 30 DAYS  
  
 hydroCHLOROthiazide 25 mg tablet Commonly known as:  HYDRODIURIL  
TAKE 1 T PO QD  
  
 hydrOXYzine pamoate 25 mg capsule Commonly known as:  VISTARIL TK 1 C PO Q 12 H PRF ANXIETY FOR 30 DAYS * pregabalin 50 mg capsule Commonly known as:  Aida Ochoa Take 1 Cap by mouth two (2) times a day. Max Daily Amount: 100 mg.  
  
 * pregabalin 75 mg capsule Commonly known as:  Aida Ochoa Take 1 Cap by mouth two (2) times a day. Max Daily Amount: 150 mg. PROVENTIL HFA 90 mcg/actuation inhaler Generic drug:  albuterol INHALE 2 PUFFS PO QID PRN  
  
 simvastatin 20 mg tablet Commonly known as:  ZOCOR TK 1 T PO QHS  
  
 sucralfate 1 gram tablet Commonly known as:  CARAFATE  
TAKE 1 T PO QID BEFORE MEALS AND AT BEDTIME  
  
 topiramate 25 mg tablet Commonly known as:  TOPAMAX 3 tabs PO QHS * Notice: This list has 2 medication(s) that are the same as other medications prescribed for you. Read the directions carefully, and ask your doctor or other care provider to review them with you. We Performed the Following REFERRAL TO NEUROLOGY [ZMA48 Custom] Comments:  
 Eval: Patient describes foot drop, can't explain from Lumbar path or EMG REFERRAL TO PAIN MANAGEMENT [CVX671 Custom] Follow-up Instructions Return if symptoms worsen or fail to improve. Referral Information Referral ID Referred By Referred To  
  
 [id number] BRENDA FARRIS III Not Available Visits Status Start Date End Date 1 New Request 5/22/18 5/22/19 If your referral has a status of pending review or denied, additional information will be sent to support the outcome of this decision. Referral ID Referred By Referred To  
 [id number] Ana Simon MD  
   [address] [facility] [address] [place], [address] Phone: [phone] Fax: [phone] Visits Status Start Date End Date 1 New Request 5/22/18 5/22/19 If your referral has a status of pending review or denied, additional information will be sent to support the outcome of this decision. Introducing [facility] & HEALTH SERVICES! Radha Woodruff introduces Hubub patient portal. Now you can access parts of your medical record, email your doctor's office, and request medication refills online. 1. In your internet browser, go to [email or web address] 2. Click on the First Time User? Click Here link in the Sign In box. You will see the New Member Sign Up page. 3. Enter your Hubub Access Code exactly as it appears below. You will not need to use this code after youve completed the sign-up process. If you do not sign up before the expiration date, you must request a new code. · Hubub Access Code: [email or web address] Expires: 8/20/2018  9:32 AM 
 
4. Enter the last four digits of your Social Security Number (xxxx) and Date of Birth (mm/dd/yyyy) as indicated and click Submit. You will be taken to the next sign-up page. 5. Create a Suliat ID. This will be your Suliat login ID and cannot be changed, so think of one that is secure and easy to remember. 6. Create a Suliat password. You can change your password at any time. 7. Enter your Password Reset Question and Answer. This can be used at a later time if you forget your password. 8. Enter your e-mail address. You will receive e-mail notification when new information is available in [address] 9. Click Sign Up. You can now view and download portions of your medical record. 10. Click the Download Summary menu link to download a portable copy of your medical information. If you have questions, please visit the Frequently Asked Questions section of the Mebelrama website. Remember, Mebelrama is NOT to be used for urgent needs. For medical emergencies, dial 911. Now available from your iPhone and Android! Please provide this summary of care documentation to your next provider. Your primary care clinician is listed as Prince Calix. If you have any questions after today's visit, please call [phone].

## 2018-05-22 NOTE — PROGRESS NOTES
[facility]  [address], [place]   Phone: [phone]  Fax: [phone]        PROGRESS NOTE      HISTORY OF PRESENT ILLNESS:  The patient is a 47 y.o. female and was seen today for follow up of chronic progressive low back pain extending into the BLE circumferentially to the feet with paraesthesias, with pain mostly localized on her bilateral knees (R=L). No specific injury/trauma. Pt reports bilateral foot drop and has had falls. Note from [facility] dated 7/5/15 indicates patient was on Methadone. Note from Dr. Cam Rivero for [facility] dated 5/4/15 indicates patient had tried injection therapy and medications x 6 months without benefit. Of note, he felt patient would be a good candidate for SCS trial. On 6/10/16, she underwent caudal block. Note from Dr. Cam Rivero DO for [facility] dated 9/16/16 indicating patient underwent bilateral L5 SNRBs (provided relief x 3 weeks). Of note, she was on Fentanyl, Percocet, Neurontin and Flexeril. Reviewing records from [facility], it appears as though the patient underwent more than three lumbar blocks in a 12-month period. Pt previously took Lyrica with slight benefit. Pt is currently taking Neurontin 300 mg QID. She notes she is intolerant to higher doses of Neurontin secondary to BUE/BLE edema. Pt is not a candidate for Cymbalta due to other medications she is currently prescribed. Per our records, pt did not tolerate TOPAMAX. Pt denies h/o lumbar spinal surgery. Per patient, a Dr. Gerson Alvarez told her she was not a surgical candidate. Pt denies h/o DM or alcoholism. Lumbar spine XR dated 1/19/15 per report revealed degenerated L3-4 and L4-5 discs, with slight progression at L5-S1. Lower lumbar facet arthropathy with progression and with interval development of minimal grade 1 anterolisthesis at L4-5 on a degenerative basis. A BLE EMG dated 10/27/17 reviewed. Report was essentially within the normal limits.  Lumbar spine MRI dated 2/13/18 reviewed. Per report, stable MRI of the lumbar spine since prior exam 8-2016. Mild L4-5 central stenosis, from grade 1 degenerative spondylolisthesis with moderate bilateral facet joint osteoarthritis and ligamentum flavum thickening, minimal disc bulge. Eccentric and moderate right L5-S1 foraminal stenosis from minimal disc bulge with eccentric left lateral spondylosis and mild asymmetric right facet joint osteoarthritis. Associated impingement on the left existing L5 nerve root. Minimal L4-5 and L5-S1 disc bulges. Multilevel lower lumbar facet joint osteoarthritis as above. 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 The patient is RHD. At her last clinic appointment, patient was neurologically intact. I tried her on Lyrica 75 mg BID. I referred her to see Dr. Theodore Blanchard to evaluate her bilateral knee complaints. I ordered bilateral L4/5 facet joint injection. The patient returns today with pain location and distribution remain unchanged. She rates pain 8/10, a slight decrease since her last visit (10/10). Pt continues to have limitations with standing tolerance. Pt underwent bilateral L4/5 facet joint block on 2/27/18 without benefit. Note from Dr. Theodore Blanchard dated 4/10/18 indicating patient was seen with c/o bilateral knee pain. Her primary complaint at that time was in the coccyx. According to her note, x-rays of both knees revealed OA of both knees and an x-ray of the coccyx revealed questionable fracture. Pt states Jade Beltran was causing her to have falls.  reviewed. Body mass index is 34.34 kg/(m^2). PCP: Jefferson Ortiz NP      Past Medical History:   Diagnosis Date    Asthma     Cancer [facility]) 2015    cervical cancer    Hypertension         Social History     Social History    Marital status: LEGALLY      Spouse name: N/A    Number of children: N/A    Years of education: N/A     Occupational History    Not on file. Social History Main Topics    Smoking status: Current Every Day Smoker     Packs/day: 1.00    Smokeless tobacco: Never Used    Alcohol use No    Drug use: Not on file    Sexual activity: Not on file     Other Topics Concern    Not on file     Social History Narrative       Current Outpatient Prescriptions   Medication Sig Dispense Refill    PROVENTIL HFA 90 mcg/actuation inhaler INHALE 2 PUFFS PO QID PRN  1    allopurinol (ZYLOPRIM) 300 mg tablet TK 1 T PO QD  6    ALPRAZolam (XANAX) 0.5 mg tablet TAKE 1 TO 2 T PO TID PRN FOR 30 DAYS  2    citalopram (CELEXA) 40 mg tablet TK 1 T PO QD  3    cyclobenzaprine (FLEXERIL) 10 mg tablet TK 1 T PO Q 8 H PRF BACK PAIN  1    hydroCHLOROthiazide (HYDRODIURIL) 25 mg tablet TAKE 1 T PO QD  6    simvastatin (ZOCOR) 20 mg tablet TK 1 T PO QHS  4    pregabalin (LYRICA) 75 mg capsule Take 1 Cap by mouth two (2) times a day. Max Daily Amount: 150 mg. (Patient not taking: Reported on 5/22/2018) 60 Cap 1    pregabalin (LYRICA) 50 mg capsule Take 1 Cap by mouth two (2) times a day. Max Daily Amount: 100 mg.  (Patient not taking: Reported on 5/22/2018) 60 Cap 0    clonazePAM (KLONOPIN) 1 mg tablet TK 1 T PO Q 8 H PRN  0    diclofenac EC (VOLTAREN) 75 mg EC tablet TK 1 T PO BID  1    gabapentin (NEURONTIN) 300 mg capsule TK 7 CS PO DAILY FOR 30 DAYS  1    hydrOXYzine pamoate (VISTARIL) 25 mg capsule TK 1 C PO Q 12 H PRF ANXIETY FOR 30 DAYS  0    sucralfate (CARAFATE) 1 gram tablet TAKE 1 T PO QID BEFORE MEALS AND AT BEDTIME  6    topiramate (TOPAMAX) 25 mg tablet 3 tabs PO QHS (Patient not taking: Reported on 5/22/2018) 90 Tab 1       No Known Allergies       PHYSICAL EXAMINATION    Visit Vitals    BP (!) 147/103    Pulse 87    Ht 4' 11\" (1.499 m)    Wt 77.1 kg (170 lb)    BMI 34.34 kg/m2       CONSTITUTIONAL: NAD, A&O x 3  SENSATION: Intact to light touch throughout  RANGE OF MOTION: The patient has full passive range of motion in all four extremities. MOTOR:  Straight Leg Raise: Negative, bilateral               Hip Flex Knee Ext Knee Flex Ankle DF GTE Ankle PF Tone   Right +4/5 +4/5 +4/5 +4/5 +4/5 +4/5 +4/5   Left +4/5 +4/5 +4/5 +4/5 +4/5 +4/5 +4/5       ASSESSMENT   Diagnoses and all orders for this visit:    1. Lumbar neuritis  -     REFERRAL TO PAIN MANAGEMENT  -     REFERRAL TO NEUROLOGY    2. Other intervertebral disc degeneration, lumbar region  -     REFERRAL TO PAIN MANAGEMENT  -     REFERRAL TO NEUROLOGY          IMPRESSION AND PLAN:  Patient pain complaints appear to be out of proportion with diagnostic testing. She is neurologically intact. Patient describes a foot drop which I cannot explain from her diagnostic testing. I have little left to offer this patient. I will refer her to pain management and to neurology for her continued falls/foot drop. I will see the patient back on an as-needed basis. Written by Cosme Martinez, as dictated by Tl Ray MD  I examined the patient, reviewed and agree with the note.

## 2019-02-15 ENCOUNTER — OFFICE VISIT (OUTPATIENT)
Dept: ORTHOPEDIC SURGERY | Age: 54
End: 2019-02-15

## 2019-02-15 VITALS
DIASTOLIC BLOOD PRESSURE: 91 MMHG | SYSTOLIC BLOOD PRESSURE: 137 MMHG | RESPIRATION RATE: 16 BRPM | TEMPERATURE: 97.7 F | WEIGHT: 173.6 LBS | HEART RATE: 83 BPM | BODY MASS INDEX: 35 KG/M2 | OXYGEN SATURATION: 98 % | HEIGHT: 59 IN

## 2019-02-15 DIAGNOSIS — M17.0 BILATERAL PRIMARY OSTEOARTHRITIS OF KNEE: Primary | ICD-10-CM

## 2019-02-15 PROBLEM — E66.01 SEVERE OBESITY (HCC): Status: ACTIVE | Noted: 2019-02-15

## 2019-02-15 RX ORDER — AMLODIPINE BESYLATE 2.5 MG/1
TABLET ORAL
Refills: 0 | COMMUNITY
Start: 2019-02-03

## 2019-02-15 RX ORDER — LAMOTRIGINE 25 MG/1
TABLET ORAL
Refills: 0 | COMMUNITY
Start: 2019-02-06

## 2019-02-15 RX ORDER — LURASIDONE HYDROCHLORIDE 20 MG/1
TABLET, FILM COATED ORAL
Refills: 0 | COMMUNITY
Start: 2019-02-06

## 2019-02-15 RX ORDER — QUETIAPINE FUMARATE 50 MG/1
TABLET, FILM COATED ORAL
Refills: 0 | COMMUNITY
Start: 2019-02-06

## 2019-02-15 RX ORDER — MIRTAZAPINE 15 MG/1
TABLET, FILM COATED ORAL
Refills: 0 | COMMUNITY
Start: 2019-02-02

## 2019-02-15 RX ORDER — MONTELUKAST SODIUM 10 MG/1
TABLET ORAL
Refills: 0 | COMMUNITY
Start: 2019-02-02

## 2019-02-15 RX ORDER — TRIAMCINOLONE ACETONIDE 40 MG/ML
40 INJECTION, SUSPENSION INTRA-ARTICULAR; INTRAMUSCULAR ONCE
Qty: 1 ML | Refills: 0
Start: 2019-02-15 | End: 2019-02-15

## 2019-02-15 NOTE — PROGRESS NOTES
Patient: Bernabe Cr                MRN: [id number]       SSN: [id number]  YOB: 1965        AGE: 46 y.o. SEX: female  Body mass index is 35.06 kg/m². PCP: Naa Boo NP  02/15/19    Chief Complaint: Bilateral knee pain    HISTORY OF PRESENT ILLNESS:  Kadeem Jefferson is a 48-year-old female who comes to the office today with bilateral knee and leg pain. She describes pain that radiates from her hip all the way down her leg to her toes on both sides. It is worse on the right. She also describes the pain and stiffness in her knees. She has seen Dr. Babak Nova as well as Dr. Karlos Celis in the past.  She also had back injection sent by [facility]. None of those have helped her pain. She takes Lyrica occasionally. She also takes anti-inflammatories. What she is describing today is pain in her knees but she does have some back pain. She also smokes. Past Medical History:   Diagnosis Date    Asthma     Cancer ([facility] [address] 2015    cervical cancer    Hypertension        Family History   Problem Relation Age of Onset    No Known Problems Mother     Cancer Father         lymph node cancer    Hypertension Father        Current Outpatient Medications   Medication Sig Dispense Refill    lamoTRIgine (LAMICTAL) 25 mg tablet take 1 tablet by mouth at bedtime for 2 weeks then INCREASE TO 50 MG AT BEDTIME  0    amLODIPine (NORVASC) 2.5 mg tablet   0    LATUDA 20 mg tab tablet take 1 tablet by mouth WITH DINNER for 1 week then INCREASE TO 40 MG WITH DINNER  0    mirtazapine (REMERON) 15 mg tablet   0    montelukast (SINGULAIR) 10 mg tablet   0    QUEtiapine (SEROQUEL) 50 mg tablet take 1/2 to 1 tablet by mouth at bedtime  0    triamcinolone acetonide (KENALOG) 40 mg/mL injection 1 mL by Intra artICUlar route once for 1 dose.  1 mL 0    PROVENTIL HFA 90 mcg/actuation inhaler INHALE 2 PUFFS PO QID PRN  1    clonazePAM (KLONOPIN) 1 mg tablet TK 1 T PO Q 8 H PRN  0    cyclobenzaprine (FLEXERIL) 10 mg tablet TK 1 T PO Q 8 H PRF BACK PAIN  1    hydroCHLOROthiazide (HYDRODIURIL) 25 mg tablet TAKE 1 T PO QD  6    simvastatin (ZOCOR) 20 mg tablet TK 1 T PO QHS  4    pregabalin (LYRICA) 75 mg capsule Take 1 Cap by mouth two (2) times a day. Max Daily Amount: 150 mg. (Patient not taking: Reported on 5/22/2018) 60 Cap 1    pregabalin (LYRICA) 50 mg capsule Take 1 Cap by mouth two (2) times a day. Max Daily Amount: 100 mg.  (Patient not taking: Reported on 5/22/2018) 60 Cap 0    allopurinol (ZYLOPRIM) 300 mg tablet TK 1 T PO QD  6    ALPRAZolam (XANAX) 0.5 mg tablet TAKE 1 TO 2 T PO TID PRN FOR 30 DAYS  2    citalopram (CELEXA) 40 mg tablet TK 1 T PO QD  3    diclofenac EC (VOLTAREN) 75 mg EC tablet TK 1 T PO BID  1    gabapentin (NEURONTIN) 300 mg capsule TK 7 CS PO DAILY FOR 30 DAYS  1    hydrOXYzine pamoate (VISTARIL) 25 mg capsule TK 1 C PO Q 12 H PRF ANXIETY FOR 30 DAYS  0    sucralfate (CARAFATE) 1 gram tablet TAKE 1 T PO QID BEFORE MEALS AND AT BEDTIME  6    topiramate (TOPAMAX) 25 mg tablet 3 tabs PO QHS (Patient not taking: Reported on 5/22/2018) 90 Tab 1       No Known Allergies    Past Surgical History:   Procedure Laterality Date    HX GYN  2015    Cervical Cancer Removal       Social History     Socioeconomic History    Marital status: LEGALLY      Spouse name: Not on file    Number of children: Not on file    Years of education: Not on file    Highest education level: Not on file   Social Needs    Financial resource strain: Not on file    Food insecurity - worry: Not on file    Food insecurity - inability: Not on file   [employer] needs - medical: Not on file   [employer] needs - non-medical: Not on file   Occupational History    Not on file   Tobacco Use    Smoking status: Current Every Day Smoker     Packs/day: 1.00    Smokeless tobacco: Never Used   Substance and Sexual Activity    Alcohol use: No    Drug use: Not on file    Sexual activity: Not on file Other Topics Concern    Not on file   Social History Narrative    Not on file       REVIEW OF SYSTEMS:      CON: negative for recent weight loss/gain, fever, or chills  EYE: negative for double or blurry vision  ENT: negative for hoarseness  RS:   negative for cough, URI, SOB  CV:  negative for chest pain, palpitations  GI:    negative for blood in stool, nausea/vomiting  :  negative for blood in urine  MS: As per HPI  Other systems reviewed and noted below. PHYSICAL EXAMINATION:  Visit Vitals  BP (!) 137/91   Pulse 83   Temp 97.7 °F (36.5 °C) (Oral)   Resp 16   Ht 4' 11\" (1.499 m)   Wt 173 lb 9.6 oz (78.7 kg)   SpO2 98%   BMI 35.06 kg/m²     Body mass index is 35.06 kg/m². GENERAL: Alert and oriented x3, in no acute distress, well-developed, well-nourished. HEENT: Normocephalic, atraumatic. RESP: Non labored breathing with equal chest rise on inspiration. CV: Well perfused extremities. No cyanosis or clubbing noted. ABDOMEN: Soft, non-tender, non-distended. PHYSICAL EXAMINATION:  Physical exam of both knees with stiffness with knee range of motion. She has pain with attempts at full extension. She also has pain with deep knee flexion. She is tender to palpation over the medial joint line. Pain with Randy's. No instability. She has no pain with hip range of motion testing. She has positive straight leg raise bilaterally. IMAGING:  X-rays previously taken of both knees are reviewed. They show knee arthritis. ASSESSMENT AND PLAN:  Trisha Moura is a 53-year-old female with bilateral leg pain and knee pain. Her leg pain I think is likely coming from her back. She has had injections. She has also seen Dr. Mona Rose. I recommend she follow up with Spine for that. For her knee pain, she would like to try injections today, which is reasonable with her diagnosis. I discussed this with her today, and she would like to move forward with it. Therefore, both knees were injected.   I also discussed with her smoking cessation as it may also be contributing to her pain and I think some of her pain is coming from her back. Follow-up as needed.     [facility] - Rosaline Saldana  OFFICE PROCEDURE PROGRESS NOTE        Chart reviewed for the following:   Hanh Fernández MD, have reviewed the History, Physical and updated the Allergic reactions for [address] performed immediately prior to start of procedure:   Hanh Fernández MD, have performed the following reviews on Gisela Police prior to the start of the procedure:            * Patient was identified by name and date of birth   * Agreement on procedure being performed was verified  * Risks and Benefits explained to the patient  * Procedure site verified and marked as necessary  * Patient was positioned for comfort  * Consent was signed and verified    Time: 0900      Date of procedure: 2/15/2019    Procedure performed by:  Froy Goodwin MD    Provider assisted by: Neal Childs LPN    Patient assisted by: self    How tolerated by patient: tolerated the procedure well with no complications    Post Procedural Pain Scale: 0 - No Hurt    Comments: none                  Electronically signed by: Froy Goodwin MD

## 2019-03-04 ENCOUNTER — OFFICE VISIT (OUTPATIENT)
Dept: ORTHOPEDIC SURGERY | Age: 54
End: 2019-03-04

## 2019-03-04 ENCOUNTER — TELEPHONE (OUTPATIENT)
Dept: ORTHOPEDIC SURGERY | Age: 54
End: 2019-03-04

## 2019-03-04 VITALS
HEIGHT: 59 IN | HEART RATE: 98 BPM | BODY MASS INDEX: 34.47 KG/M2 | TEMPERATURE: 98.9 F | DIASTOLIC BLOOD PRESSURE: 97 MMHG | RESPIRATION RATE: 18 BRPM | SYSTOLIC BLOOD PRESSURE: 140 MMHG | OXYGEN SATURATION: 98 % | WEIGHT: 171 LBS

## 2019-03-04 DIAGNOSIS — M54.16 LUMBAR RADICULOPATHY: Primary | ICD-10-CM

## 2019-03-04 DIAGNOSIS — M53.3 SACROILIAC JOINT DYSFUNCTION OF RIGHT SIDE: ICD-10-CM

## 2019-03-04 DIAGNOSIS — M51.36 OTHER INTERVERTEBRAL DISC DEGENERATION, LUMBAR REGION: ICD-10-CM

## 2019-03-04 DIAGNOSIS — M51.26 LUMBAR HERNIATED DISC: Primary | ICD-10-CM

## 2019-03-04 RX ORDER — OXCARBAZEPINE 300 MG/1
TABLET, FILM COATED ORAL
Refills: 0 | COMMUNITY
Start: 2019-02-28

## 2019-03-04 RX ORDER — SUVOREXANT 15 MG/1
TABLET, FILM COATED ORAL
Refills: 0 | COMMUNITY
Start: 2019-02-21

## 2019-03-04 NOTE — PROGRESS NOTES
[facility] 
[address] [address] Phone: [phone] Fax: [phone] PROGRESS NOTE HISTORY OF PRESENT ILLNESS: 
The patient is a 46 y.o. female and was seen today for follow up of chronic progressive low back pain extending into the BLE circumferentially to the feet with paraesthesias, with pain mostly localized on her bilateral knees (R=L). No specific injury/trauma. Pt reports bilateral foot drop and has had falls. Note from [facility] dated 7/5/15 indicates patient was on Methadone. Note from Dr. Nelia Stevenson for [facility] dated 5/4/15 indicates patient had tried injection therapy and medications x 6 months without benefit. Of note, he felt patient would be a good candidate for SCS trial. On 6/10/16, she underwent caudal block. Note from Dr. Nelia Stevenson DO for [facility] dated 9/16/16 indicating patient underwent bilateral L5 SNRBs (provided relief x 3 weeks). Of note, she was on Fentanyl, Percocet, Neurontin and Flexeril. Reviewing records from [facility], it appears as though the patient underwent more than three lumbar blocks in a 12-month period. Note from Dr. Patricia Gasca dated 4/10/18 indicating patient was seen with c/o bilateral knee pain. Her primary complaint at that time was in the coccyx. According to her note, x-rays of both knees revealed OA of both knees and an x-ray of the coccyx revealed questionable fracture. Pt underwent bilateral L4/5 facet joint block on 2/27/18 without benefit. Pt previously took Lyrica with slight benefit. Pt is currently taking Neurontin 300 mg QID. She notes she is intolerant to higher doses of Neurontin secondary to BUE/BLE edema. Pt is not a candidate for Cymbalta due to other medications she is currently prescribed. Per our records, pt did not tolerate TOPAMAX. Pt states Michael Echevaria was causing her to have falls. Pt denies h/o lumbar spinal surgery.  Per patient, a Dr. Luciano Kennedy told her she was not a surgical candidate. Pt denies h/o DM or alcoholism. Lumbar spine XR dated 1/19/15 per report revealed degenerated L3-4 and L4-5 discs, with slight progression at L5-S1. Lower lumbar facet arthropathy with progression and with interval development of minimal grade 1 anterolisthesis at L4-5 on a degenerative basis. A BLE EMG dated 10/27/17 reviewed. Report was essentially within the normal limits. Lumbar spine MRI dated 2/13/18 reviewed. Per report, stable MRI of the lumbar spine since prior exam 8-2016. Mild L4-5 central stenosis, from grade 1 degenerative spondylolisthesis with moderate bilateral facet joint osteoarthritis and ligamentum flavum thickening, minimal disc bulge. Eccentric and moderate right L5-S1 foraminal stenosis from minimal disc bulge with eccentric left lateral spondylosis and mild asymmetric right facet joint osteoarthritis. Associated impingement on the left existing L5 nerve root. Minimal L4-5 and L5-S1 disc bulges. Multilevel lower lumbar facet joint osteoarthritis as above. 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 The patient is RHD. At her last clinic appointment, patient pain complaints appeared to be out of proportion with diagnostic testing. Patient described a foot drop which I could not explain from her diagnostic testing. I had little left to offer this patient. I referred her to pain management and to neurology for her continued falls/foot drop. The patient returns today with pain location and distribution remain unchanged. She additionally endorses right buttock pain. She rates her pain 9/10, previously 8/10. Last seen by me May of 2018. Pt was previously referred to chronic pain management and she has an upcoming appointment in April. Pt denies change in bowel or bladder habits.  Note from Dr. Roberto Pizano dated 7/5/18 indicating patient was seen with c/o chronic back and leg pain. Pt had c/o weakness in her legs. Started her on Cymbalta. Set her up for EMG of BLE. Per pt, she has to pay balance before they will do an EMG. Note from Dr. Abdulaziz Lai dated 2/15/19 indicating patient was seen with c/o bilateral leg and knee pain extending from her hips to her feet. She had injections in the past. She was taking Lyrica occasionally at that time. Referred back to us.  reviewed. Body mass index is 34.54 kg/m². PCP: Fina Cannon MD 
 
 
Past Medical History:  
Diagnosis Date  Asthma  [facility]  
 cervical cancer  Hypertension Social History Socioeconomic History  Marital status: LEGALLY  Spouse name: Not on file  Number of children: Not on file  Years of education: Not on file  Highest education level: Not on file Social Needs  Financial resource strain: Not on file  Food insecurity - worry: Not on file  Food insecurity - inability: Not on file  Transportation needs - medical: Not on file  Transportation needs - non-medical: Not on file Occupational History  Not on file Tobacco Use  Smoking status: Current Every Day Smoker Packs/day: 1.00  Smokeless tobacco: Never Used Substance and Sexual Activity  Alcohol use: No  
 Drug use: Not on file  Sexual activity: Not on file Other Topics Concern  Not on file Social History Narrative  Not on file Current Outpatient Medications Medication Sig Dispense Refill  OXcarbazepine (TRILEPTAL) 300 mg tablet take 1 tablet by mouth every morning and 2 tablets at bedtime  0  
 BELSOMRA 15 mg tablet   0  
 lamoTRIgine (LAMICTAL) 25 mg tablet take 1 tablet by mouth at bedtime for 2 weeks then INCREASE TO 50 MG AT BEDTIME  0  
 amLODIPine (NORVASC) 2.5 mg tablet   0  
 LATUDA 20 mg tab tablet take 1 tablet by mouth WITH DINNER for 1 week then INCREASE TO 40 MG WITH DINNER  0  
 montelukast (SINGULAIR) 10 mg tablet   0  
  QUEtiapine (SEROQUEL) 50 mg tablet take 1/2 to 1 tablet by mouth at bedtime  0  
 PROVENTIL HFA 90 mcg/actuation inhaler INHALE 2 PUFFS PO QID PRN  1  
 clonazePAM (KLONOPIN) 1 mg tablet TK 1 T PO Q 8 H PRN  0  
 cyclobenzaprine (FLEXERIL) 10 mg tablet TK 1 T PO Q 8 H PRF BACK PAIN  1  
 hydroCHLOROthiazide (HYDRODIURIL) 25 mg tablet TAKE 1 T PO QD  6  
 simvastatin (ZOCOR) 20 mg tablet TK 1 T PO QHS  4  
 mirtazapine (REMERON) 15 mg tablet   0  
 pregabalin (LYRICA) 75 mg capsule Take 1 Cap by mouth two (2) times a day. Max Daily Amount: 150 mg. (Patient not taking: Reported on 5/22/2018) 60 Cap 1  pregabalin (LYRICA) 50 mg capsule Take 1 Cap by mouth two (2) times a day. Max Daily Amount: 100 mg. (Patient not taking: Reported on 5/22/2018) 60 Cap 0  
 allopurinol (ZYLOPRIM) 300 mg tablet TK 1 T PO QD  6  
 ALPRAZolam (XANAX) 0.5 mg tablet TAKE 1 TO 2 T PO TID PRN FOR 30 DAYS  2  
 citalopram (CELEXA) 40 mg tablet TK 1 T PO QD  3  
 diclofenac EC (VOLTAREN) 75 mg EC tablet TK 1 T PO BID  1  
 gabapentin (NEURONTIN) 300 mg capsule TK 7 CS PO DAILY FOR 30 DAYS  1  
 hydrOXYzine pamoate (VISTARIL) 25 mg capsule TK 1 C PO Q 12 H PRF ANXIETY FOR 30 DAYS  0  
 sucralfate (CARAFATE) 1 gram tablet TAKE 1 T PO QID BEFORE MEALS AND AT BEDTIME  6  
 topiramate (TOPAMAX) 25 mg tablet 3 tabs PO QHS (Patient not taking: Reported on 5/22/2018) 90 Tab 1 No Known Allergies PHYSICAL EXAMINATION Visit Vitals BP (!) 140/97 Comment: pt asymptomatic Pulse 98 Temp 98.9 °F (37.2 °C) (Oral) Resp 18 Ht 4' 11\" (1.499 m) Wt 171 lb (77.6 kg) SpO2 98% BMI 34.54 kg/m² CONSTITUTIONAL: NAD, A&O x 3 SENSATION: Intact to light touch throughout. Increased tenderness to palpation of right SI joint. RANGE OF MOTION: The patient has full passive range of motion in all four extremities. MOTOR: 
Straight Leg Raise: Negative, bilateral  
ARMINDA SIGN: Positive, left Hip Flex Knee Ext Knee Flex Ankle DF GTE Ankle PF Tone Right +4/5 +4/5 +4/5 +4/5 +4/5 +4/5 +4/5 Left +4/5 +4/5 +4/5 +4/5 +4/5 +4/5 +4/5 ASSESSMENT Diagnoses and all orders for this visit: 1. Lumbar radiculopathy 2. Other intervertebral disc degeneration, lumbar region 3. Sacroiliac joint dysfunction of right side IMPRESSION AND PLAN: 
The patient returns today with pain location and distribution remain unchanged. She additionally endorses right buttock pain. Clinically, she demonstrates sxs consistent with right SI joint dysfunction. Patient wishes to proceed with a right SI joint injection for diagnostic purposes. I recommend she attend her appointment at chronic pain management as scheduled. Patient is neurologically intact. I will see the patient back following block. Written by Arlette Garner, as dictated by Ingrid An MD 
I examined the patient, reviewed and agree with the note.

## 2019-03-04 NOTE — PROGRESS NOTES
Chief Complaint Patient presents with  Back Pain Follow up low back pain  Hip Pain  
  right hip 1. Have you been to the ER, urgent care clinic since your last visit? Hospitalized since your last visit? No 
 
2. Have you seen or consulted any other health care providers outside of the [address] [place] [address] since your last visit? Include any pap smears or colon screening.  No

## 2019-03-05 ENCOUNTER — TELEPHONE (OUTPATIENT)
Dept: ORTHOPEDIC SURGERY | Age: 54
End: 2019-03-05

## 2019-03-05 RX ORDER — DIAZEPAM 10 MG/1
TABLET ORAL
Qty: 1 TAB | Refills: 0 | OUTPATIENT
Start: 2019-03-05

## 2019-06-05 ENCOUNTER — OFFICE VISIT (OUTPATIENT)
Dept: ORTHOPEDIC SURGERY | Age: 54
End: 2019-06-05

## 2019-06-05 VITALS
SYSTOLIC BLOOD PRESSURE: 151 MMHG | HEIGHT: 59 IN | RESPIRATION RATE: 16 BRPM | OXYGEN SATURATION: 99 % | BODY MASS INDEX: 34.56 KG/M2 | WEIGHT: 171.4 LBS | HEART RATE: 75 BPM | DIASTOLIC BLOOD PRESSURE: 98 MMHG

## 2019-06-05 DIAGNOSIS — M17.0 BILATERAL PRIMARY OSTEOARTHRITIS OF KNEE: Primary | ICD-10-CM

## 2019-06-05 RX ORDER — TRIAMCINOLONE ACETONIDE 40 MG/ML
40 INJECTION, SUSPENSION INTRA-ARTICULAR; INTRAMUSCULAR ONCE
Qty: 1 ML | Refills: 0
Start: 2019-06-05 | End: 2019-06-05

## 2019-06-05 RX ORDER — LAMOTRIGINE 100 MG/1
TABLET ORAL DAILY
COMMUNITY

## 2019-06-05 NOTE — PROGRESS NOTES
Patient: Karla Smith                MRN: [id number]       SSN: [id number]  YOB: 1965        AGE: 46 y.o. SEX: female  Body mass index is 34.62 kg/m². PCP: Yoav Vidales MD  06/05/19    Chief Complaint: Bilateral knee, back and foot/leg pain    HISTORY OF PRESENT ILLNESS:  Shamir Whittaker returns to the office today for both of her knees, as well as pain in her shins. She also has pain in the base of her right foot. She also has back pain for which she is seeing Dr. Rachel Jasso. She has gotten injections in her knees the last time she was here that helped some of her pain, not all of it. That pain has started to return. She also has gotten a back injection by Dr. Rachel Jasso in the SI joint, which she says helped some of the pain, not all of it. She describes shin pain that is better at night when she takes a Flexeril, although the Flexeril makes her tired. She also describes pain and a fullness on the bottom of her foot, not associated with pain. She has pain when she pushes mops or sweeps. Past Medical History:   Diagnosis Date    Asthma     Cancer ([facility] [address] 2015    cervical cancer    Depression     Hypertension        Family History   Problem Relation Age of Onset    No Known Problems Mother     Cancer Father         lymph node cancer    Hypertension Father        Current Outpatient Medications   Medication Sig Dispense Refill    lamoTRIgine (LAMICTAL) 100 mg tablet Take  by mouth daily.  lurasidone (LATUDA) 80 mg tab tablet Take  by mouth.  triamcinolone acetonide (KENALOG) 40 mg/mL injection 1 mL by Intra artICUlar route once for 1 dose.  1 mL 0    diazePAM (VALIUM) 10 mg tablet Take 1 tab by mouth as directed by nurse prior to procedure 1 Tab 0    OXcarbazepine (TRILEPTAL) 300 mg tablet take 1 tablet by mouth every morning and 2 tablets at bedtime  0    BELSOMRA 15 mg tablet   0    amLODIPine (NORVASC) 2.5 mg tablet   0    montelukast (SINGULAIR) 10 mg tablet   0    QUEtiapine (SEROQUEL) 50 mg tablet take 1/2 to 1 tablet by mouth at bedtime  0    PROVENTIL HFA 90 mcg/actuation inhaler INHALE 2 PUFFS PO QID PRN  1    ALPRAZolam (XANAX) 0.5 mg tablet TAKE 1 TO 2 T PO TID PRN FOR 30 DAYS  2    clonazePAM (KLONOPIN) 1 mg tablet TK 1 T PO Q 8 H PRN  0    cyclobenzaprine (FLEXERIL) 10 mg tablet TK 1 T PO Q 8 H PRF BACK PAIN  1    hydroCHLOROthiazide (HYDRODIURIL) 25 mg tablet TAKE 1 T PO QD  6    simvastatin (ZOCOR) 20 mg tablet TK 1 T PO QHS  4    lamoTRIgine (LAMICTAL) 25 mg tablet take 1 tablet by mouth at bedtime for 2 weeks then INCREASE TO 50 MG AT BEDTIME  0    LATUDA 20 mg tab tablet take 1 tablet by mouth WITH DINNER for 1 week then INCREASE TO 40 MG WITH DINNER  0    mirtazapine (REMERON) 15 mg tablet   0    pregabalin (LYRICA) 75 mg capsule Take 1 Cap by mouth two (2) times a day. Max Daily Amount: 150 mg. (Patient not taking: Reported on 5/22/2018) 60 Cap 1    pregabalin (LYRICA) 50 mg capsule Take 1 Cap by mouth two (2) times a day. Max Daily Amount: 100 mg.  (Patient not taking: Reported on 5/22/2018) 60 Cap 0    allopurinol (ZYLOPRIM) 300 mg tablet TK 1 T PO QD  6    citalopram (CELEXA) 40 mg tablet TK 1 T PO QD  3    diclofenac EC (VOLTAREN) 75 mg EC tablet TK 1 T PO BID  1    gabapentin (NEURONTIN) 300 mg capsule TK 7 CS PO DAILY FOR 30 DAYS  1    hydrOXYzine pamoate (VISTARIL) 25 mg capsule TK 1 C PO Q 12 H PRF ANXIETY FOR 30 DAYS  0    sucralfate (CARAFATE) 1 gram tablet TAKE 1 T PO QID BEFORE MEALS AND AT BEDTIME  6    topiramate (TOPAMAX) 25 mg tablet 3 tabs PO QHS (Patient not taking: Reported on 5/22/2018) 90 Tab 1       No Known Allergies    Past Surgical History:   Procedure Laterality Date    HX GYN  2015    Cervical Cancer Removal       Social History     Socioeconomic History    Marital status: LEGALLY      Spouse name: Not on file    Number of children: Not on file    Years of education: Not on file    Highest education level: Not on file   Occupational History    Not on file   Social Needs    Financial resource strain: Not on file    Food insecurity:     Worry: Not on file     Inability: Not on file    Transportation needs:     Medical: Not on file     Non-medical: Not on file   Tobacco Use    Smoking status: Current Every Day Smoker     Packs/day: 1.00    Smokeless tobacco: Never Used   Substance and Sexual Activity    Alcohol use: No    Drug use: Not on file    Sexual activity: Not on file   Lifestyle    Physical activity:     Days per week: Not on file     Minutes per session: Not on file    Stress: Not on file   Relationships    Social connections:     Talks on phone: Not on file     Gets together: Not on file     Attends Rastafarian service: Not on file     Active member of club or organization: Not on file     Attends meetings of clubs or organizations: Not on file     Relationship status: Not on file    Intimate partner violence:     Fear of current or ex partner: Not on file     Emotionally abused: Not on file     Physically abused: Not on file     Forced sexual activity: Not on file   Other Topics Concern    Not on file   Social History Narrative    Not on file       REVIEW OF SYSTEMS:      No changes from previous review of systems unless noted. PHYSICAL EXAMINATION:  Visit Vitals  BP (!) 151/98 (BP 1 Location: Right arm, BP Patient Position: Sitting)   Pulse 75   Resp 16   Ht 4' 11\" (1.499 m)   Wt 171 lb 6.4 oz (77.7 kg)   SpO2 99%   BMI 34.62 kg/m²     Body mass index is 34.62 kg/m². GENERAL: Alert and oriented x3, in no acute distress. HEENT: Normocephalic, atraumatic. RESP: Non labored breathing. SKIN: No rashes or lesions noted. PHYSICAL EXAM:  Physical exam of bilateral knees with mild crepitus. No effusion. Minimal changes in her range of motion. She is tender to palpation along the medial joint line. No instability.       Examination of the right foot with a small firm, non-mobile mass in the arch. This is not tender to palpation. It is not erythematous. I do not appreciate any other changes about the foot or ankle. She is mildly tender to palpation along both shins. ASSESSMENT AND PLAN:   Jared is a 46year-old female with bilateral knee pain, shin pain and a right foot mass. I do not think the foot mass is anything consequential.  I would watch this. Her shin pain I think is likely some tendonitis of the tibialis anterior, possibly shin splints. I have recommended a topical muscle cream for that, as well as stretching. For the knees, we will try another set of cortisone injections today to see how that does. I continue to recommend she follow up with her chronic pain management physician, as well as Dr. Dakota Rodriguez for her back pain. I also recommended she discontinue smoking, as I think it is contributing to all of her complaints, as well as possible other problems in her health.        [facility] - [facility]  OFFICE PROCEDURE PROGRESS NOTE        Chart reviewed for the following:   Joy Coker MD, have reviewed the History, Physical and updated the Allergic reactions for [facility] [address] performed immediately prior to start of procedure:   Joy Coker MD, have performed the following reviews on [facility] prior to the start of the procedure:            * Patient was identified by name and date of birth   * Agreement on procedure being performed was verified  * Risks and Benefits explained to the patient  * Procedure site verified and marked as necessary  * Patient was positioned for comfort  * Consent was signed and verified    Time: 1315      Date of procedure: 6/5/2019    Procedure performed by:  Mag Pastor MD    Provider assisted by: Radha Trivedi LPN    Patient assisted by: self    How tolerated by patient: tolerated the procedure well with no complications    Post Procedural Pain Scale: 0 - No Bere    Comments: none                  Electronically signed by: Rupert Fisher MD

## 2020-01-08 ENCOUNTER — OFFICE VISIT (OUTPATIENT)
Dept: ORTHOPEDIC SURGERY | Age: 55
End: 2020-01-08

## 2020-01-08 VITALS
SYSTOLIC BLOOD PRESSURE: 91 MMHG | OXYGEN SATURATION: 99 % | WEIGHT: 172 LBS | DIASTOLIC BLOOD PRESSURE: 62 MMHG | HEIGHT: 59 IN | HEART RATE: 68 BPM | BODY MASS INDEX: 34.68 KG/M2 | RESPIRATION RATE: 16 BRPM

## 2020-01-08 DIAGNOSIS — M17.0 BILATERAL PRIMARY OSTEOARTHRITIS OF KNEE: Primary | ICD-10-CM

## 2020-01-08 RX ORDER — OLMESARTAN MEDOXOMIL 5 MG/1
TABLET ORAL
Refills: 0 | COMMUNITY
Start: 2019-11-20

## 2020-01-08 RX ORDER — ARIPIPRAZOLE 5 MG/1
TABLET ORAL
COMMUNITY
Start: 2019-12-26

## 2020-01-08 RX ORDER — ATORVASTATIN CALCIUM 40 MG/1
40 TABLET, FILM COATED ORAL
COMMUNITY
Start: 2019-08-22

## 2020-01-08 RX ORDER — TRAZODONE HYDROCHLORIDE 150 MG/1
TABLET ORAL
Refills: 0 | COMMUNITY
Start: 2019-10-14

## 2020-01-08 RX ORDER — DICLOFENAC SODIUM 10 MG/G
2 GEL TOPICAL 4 TIMES DAILY
Qty: 100 G | Refills: 2 | Status: SHIPPED | OUTPATIENT
Start: 2020-01-08

## 2020-01-08 RX ORDER — ASPIRIN 81 MG/1
81 TABLET ORAL
COMMUNITY
Start: 2019-08-22

## 2020-01-08 RX ORDER — HYALURONATE SODIUM 10 MG/ML
2 SYRINGE (ML) INTRAARTICULAR ONCE
Qty: 2 ML | Refills: 0
Start: 2020-01-08 | End: 2020-01-08

## 2020-01-08 NOTE — PROGRESS NOTES
Donis Banerjee  1965   Chief Complaint   Patient presents with    Knee Pain     bilateral knee pain        HISTORY OF PRESENT ILLNESS  Donis Banerjee is a 53 y.o. female who presents today for reevaluation of bilateral knee pain. She was last seen by Dr. Guanakito Arthur and she has moderate arthritis of both knees. She has had cortisone injections in the past and reports only a couple days of relief. The pain is achy and located mainly lateral joint line and posterior aspect of the knees. The pain is worse with any walking or standing. She reports the pain is 10/10 today. She is unable to get around to do normal everyday things due to the pain. This is affecting her daily life. Patient denies any fever, chills, chest pain, shortness of breath or calf pain. There are no new illness or injuries to report since last seen in the office. No changes in medications, allergies, social or family history. PHYSICAL EXAM:   Visit Vitals  BP 91/62   Pulse 68   Resp 16   Ht 4' 11\" (1.499 m)   Wt 172 lb (78 kg)   SpO2 99%   BMI 34.74 kg/m²     The patient is a well-developed, well-nourished female   in no acute distress. The patient is alert and oriented times three. Mood and affect are normal.  LYMPHATIC: lymph nodes are not enlarged and are within normal limits  SKIN: normal in color and non tender to palpation. There are no bruises or abrasions noted. NEUROLOGICAL: Motor sensory exam is within normal limits. Reflexes are equal bilaterally.  There is normal sensation to pinprick and light touch  MUSCULOSKELETAL:  Examination Left knee Right knee   Skin Intact Intact   Range of motion 0-90 0-100   Effusion - -   Medial joint line tenderness + +   Lateral joint line tenderness + +   Tenderness Pes Bursa - -   Tenderness insertion MCL - -   Tenderness insertion LCL - -   Randys - -   Patella crepitus + +   Patella grind - -   Lachman Not assessed Not assessed   Pivot shift Not assessed Not assessed   Anterior drawer Not assessed Not assessed   Posterior drawer Not assessed Not assessed   Varus stress - -   Valgus stress - -   Neurovascular Intact Intact   Calf Swelling and Tenderness to Palpation - -   Tommy's Test - -   Hamstring Cord Tightness - -        PROCEDURE: After sterile prep, 2 cc of Euflexxa were injected into the bilateral  knees. [facility] - [facility]  OFFICE PROCEDURE PROGRESS NOTE        Chart reviewed for the following:  Ulices HILL PA, have reviewed the History, Physical and updated the Allergic reactions for Aimee A [address] performed immediately prior to start of procedure:  Ulices HILL PA-C, have performed the following reviews on Angie Nice prior to the start of the procedure:            * Patient was identified by name and date of birth   * Agreement on procedure being performed was verified  * Risks and Benefits explained to the patient  * Procedure site verified and marked as necessary  * Patient was positioned for comfort  * Consent was signed and verified     Time: 10:06 AM    Date of procedure: 1/8/2020    Procedure performed by:  RAZA Shelton    Provider assisted by: (see medication administration)    How tolerated by patient: tolerated the procedure well with no complications    Comments: none    IMAGING: previous xrays from 2018 reviewed which show moderate OA    IMPRESSION:      ICD-10-CM ICD-9-CM    1. Bilateral primary osteoarthritis of knee M17.0 715.16         PLAN:   Pt having bilateral knee pain  I think her pain is coming from arthritis. She does not want a cortisone injection at this time. She only received a couple days of relief. Will move forward with Euflexxa injection series. She tolerated it well today. If she does not get relief from these we may try one more cortisone injection. If she gets no relief at that point will likely refer to one of our total joint surgeons for evaluation.   Will also put a rx in for volteran gel.   RTC over the next 2 weeks for series      Roxanne Mullen PA-C  [facility] Special[facility]

## 2020-01-15 ENCOUNTER — OFFICE VISIT (OUTPATIENT)
Dept: ORTHOPEDIC SURGERY | Age: 55
End: 2020-01-15

## 2020-01-15 VITALS
HEIGHT: 59 IN | BODY MASS INDEX: 34.64 KG/M2 | WEIGHT: 171.8 LBS | SYSTOLIC BLOOD PRESSURE: 104 MMHG | OXYGEN SATURATION: 99 % | DIASTOLIC BLOOD PRESSURE: 69 MMHG | HEART RATE: 76 BPM

## 2020-01-15 DIAGNOSIS — M17.0 BILATERAL PRIMARY OSTEOARTHRITIS OF KNEE: Primary | ICD-10-CM

## 2020-01-15 RX ORDER — HYALURONATE SODIUM 10 MG/ML
2 SYRINGE (ML) INTRAARTICULAR ONCE
Qty: 2 ML | Refills: 0
Start: 2020-01-15 | End: 2020-01-15

## 2020-01-15 NOTE — PROGRESS NOTES
Patient: Angie Watson                MRN: [id number]       SSN: [id number]  YOB: 1965        AGE: 53 y.o. SEX: female  There is no height or weight on file to calculate BMI. PCP: Sujatha Moran MD  01/15/20    No chief complaint on file. HISTORY:  Angie Watson is a 53 y.o. female who is seen for reevaluation of Bilateral knee here for 2nd injection of euflexxa. PROCEDURE:  Patient's Bilateral knee, after timeout under sterile conditions, was injected with 2 cc of Euflexxa. [facility] - [facility]  OFFICE PROCEDURE PROGRESS NOTE        Chart reviewed for the following:   Ulices HILL PA-C, have reviewed the History, Physical and updated the Allergic reactions for [address] performed immediately prior to start of procedure:   Ulices HILL PA-C, have performed the following reviews on Angie Watson prior to the start of the procedure:            * Patient was identified by name and date of birth   * Agreement on procedure being performed was verified  * Risks and Benefits explained to the patient  * Procedure site verified and marked as necessary  * Patient was positioned for comfort  * Consent was signed and verified     Time: 11:35 AM    Date of procedure: 1/15/2020    Procedure performed by:  RAZA Shelton    Provider assisted by: None     How tolerated by patient: tolerated the procedure well with no complications    Comments: none    IMPRESSION:     ICD-10-CM ICD-9-CM    1. Bilateral primary osteoarthritis of knee M17.0 715.16         PLAN:  Ms. Andrew Hernandez will return in one week for her third Euflexxa injection.       Ulices Delarosa, [facility] and Spine Specialist

## 2020-01-22 ENCOUNTER — OFFICE VISIT (OUTPATIENT)
Dept: ORTHOPEDIC SURGERY | Age: 55
End: 2020-01-22

## 2020-01-22 DIAGNOSIS — M17.0 BILATERAL PRIMARY OSTEOARTHRITIS OF KNEE: Primary | ICD-10-CM

## 2020-01-22 RX ORDER — HYALURONATE SODIUM 10 MG/ML
2 SYRINGE (ML) INTRAARTICULAR ONCE
Qty: 2 ML | Refills: 0
Start: 2020-01-22 | End: 2020-01-22

## 2020-01-22 NOTE — PROGRESS NOTES
Patient: Donis Banerjee                MRN: [id number]       SSN: [id number]  YOB: 1965        AGE: 53 y.o. SEX: female  There is no height or weight on file to calculate BMI. PCP: Josefa Chow MD  01/22/20    No chief complaint on file. HISTORY:  Donis Banerjee is a 53 y.o. female who is seen for reevaluation of Bilateral knee here for 3rd and final injection of euflexxa. PROCEDURE:  Patient's Bilateral knee, after timeout under sterile conditions, was injected with 2 cc of Euflexxa. [facility] - [facility]  OFFICE PROCEDURE PROGRESS NOTE        Chart reviewed for the following:   Valerie HILL PA-C, have reviewed the History, Physical and updated the Allergic reactions for [address] performed immediately prior to start of procedure:   Valerie HILL PA-C, have performed the following reviews on Donis Banerjee prior to the start of the procedure:            * Patient was identified by name and date of birth   * Agreement on procedure being performed was verified  * Risks and Benefits explained to the patient  * Procedure site verified and marked as necessary  * Patient was positioned for comfort  * Consent was signed and verified     Time: 10:18 AM       Date of procedure: 1/22/2020    Procedure performed by:  RAZA Maza    Provider assisted by: None     How tolerated by patient: tolerated the procedure well with no complications    Comments: none    IMPRESSION:     ICD-10-CM ICD-9-CM    1. Bilateral primary osteoarthritis of knee M17.0 715.16         PLAN: Ms. Luci Viveros has completed her Euflexxa injection series. she will return as needed.       [facility] and Spine Specialist

## 2020-03-04 ENCOUNTER — OFFICE VISIT (OUTPATIENT)
Dept: ORTHOPEDIC SURGERY | Age: 55
End: 2020-03-04

## 2020-03-04 VITALS
OXYGEN SATURATION: 97 % | DIASTOLIC BLOOD PRESSURE: 70 MMHG | HEIGHT: 59 IN | HEART RATE: 81 BPM | WEIGHT: 172.2 LBS | SYSTOLIC BLOOD PRESSURE: 105 MMHG | BODY MASS INDEX: 34.72 KG/M2

## 2020-03-04 DIAGNOSIS — M17.0 BILATERAL PRIMARY OSTEOARTHRITIS OF KNEE: Primary | ICD-10-CM

## 2020-03-04 RX ORDER — BETAMETHASONE SODIUM PHOSPHATE AND BETAMETHASONE ACETATE 3; 3 MG/ML; MG/ML
6 INJECTION, SUSPENSION INTRA-ARTICULAR; INTRALESIONAL; INTRAMUSCULAR; SOFT TISSUE ONCE
Qty: 1 ML | Refills: 0
Start: 2020-03-04 | End: 2020-03-04

## 2020-03-04 RX ORDER — BUDESONIDE AND FORMOTEROL FUMARATE DIHYDRATE 160; 4.5 UG/1; UG/1
AEROSOL RESPIRATORY (INHALATION)
COMMUNITY
Start: 2020-01-14

## 2020-03-04 NOTE — PROGRESS NOTES
Fatimah Lorenzana  1965   Chief Complaint   Patient presents with    Knee Pain     bilateral        HISTORY OF PRESENT ILLNESS  Fatimah Lorenzana is a 53 y.o. female who presents today for reevaluation of bilateral knee pain. She has a history of bilateral knee arthritis. She has had cortisone and HA injections which do not provide much relief. She comes in today for follow up and recommendations. Her pain is located on the globally about the knee. She also has a lot of pain behind her knee. Worse with prolong walking and standing. She also has stiffness of the knees. Patient denies any fever, chills, chest pain, shortness of breath or calf pain. There are no new illness or injuries to report since last seen in the office. No changes in medications, allergies, social or family history. PHYSICAL EXAM:   Visit Vitals  /70 (BP 1 Location: Left arm, BP Patient Position: Sitting)   Pulse 81   Ht 4' 11\" (1.499 m)   Wt 172 lb 3.2 oz (78.1 kg)   SpO2 97%   BMI 34.78 kg/m²     The patient is a well-developed, well-nourished female   in no acute distress. The patient is alert and oriented times three. Mood and affect are normal.  LYMPHATIC: lymph nodes are not enlarged and are within normal limits  SKIN: normal in color and non tender to palpation. There are no bruises or abrasions noted. NEUROLOGICAL: Motor sensory exam is within normal limits. Reflexes are equal bilaterally.  There is normal sensation to pinprick and light touch  MUSCULOSKELETAL:  Examination Left knee Right knee   Skin Intact Intact   Range of motion 0-120 0-120   Effusion - -   Medial joint line tenderness ++ ++   Lateral joint line tenderness + +   Tenderness Pes Bursa - -   Tenderness insertion MCL - -   Tenderness insertion LCL - -   Randys - -   Patella crepitus + +   Patella grind - -   Lachman - -   Pivot shift - -   Anterior drawer - -   Posterior drawer - -   Varus stress - -   Valgus stress - -   Neurovascular Intact Intact   Calf Swelling and Tenderness to Palpation - -   Tommy's Test - -   Hamstring Cord Tightness - -        PROCEDURE: After sterile prep, 3 cc of Xylocaine and 1 cc of Celestone were injected into the bilateral  knees. [facility]  OFFICE PROCEDURE PROGRESS NOTE        Chart reviewed for the following:  Scar HILL PA, have reviewed the History, Physical and updated the Allergic reactions for [address] performed immediately prior to start of procedure:  Scar HILL PA-C, have performed the following reviews on [facility] prior to the start of the procedure:            * Patient was identified by name and date of birth   * Agreement on procedure being performed was verified  * Risks and Benefits explained to the patient  * Procedure site verified and marked as necessary  * Patient was positioned for comfort  * Consent was signed and verified     Time: 11:46 AM    Date of procedure: 3/4/2020    Procedure performed by:  RAZA Guzmán    Provider assisted by: (see medication administration)    How tolerated by patient: tolerated the procedure well with no complications    Comments: none    IMAGING: 3 views of both knees 3/4/20 show moderate/severe arthritis of the medial compartment. Mild arthritis of the patellofemoral compartment. Varus deformity noted. IMPRESSION:      ICD-10-CM ICD-9-CM    1. Bilateral primary osteoarthritis of knee M17.0 715.16 AMB POC X-RAY KNEE 3 VIEW      AMB POC X-RAY KNEE 3 VIEW        PLAN:   Pt having bilateral knee pain  I think her pain is coming from arthritis. I have recommended a cortisone injection today. She was given a cortisone injection for both knees today. She tolerated it well. Talked about having tried most of the conservative treatments for her arthritis.  If she does not get any relief from the cortisone injection will likely refer her to one of the total joint surgeons for evaluation and possible surgical treatment.   Pt given business cards for Dr. Nigel Mcbride and Dr. Chirinos Simple 6 weeks    [facility] Specialist

## 2020-05-07 ENCOUNTER — VIRTUAL VISIT (OUTPATIENT)
Dept: ORTHOPEDIC SURGERY | Age: 55
End: 2020-05-07

## 2020-05-07 DIAGNOSIS — M17.0 BILATERAL PRIMARY OSTEOARTHRITIS OF KNEE: Primary | ICD-10-CM

## 2020-05-07 RX ORDER — DICLOFENAC SODIUM 75 MG/1
75 TABLET, DELAYED RELEASE ORAL 2 TIMES DAILY WITH MEALS
Qty: 60 TAB | Refills: 2 | Status: SHIPPED | OUTPATIENT
Start: 2020-05-07 | End: 2020-07-22

## 2020-05-07 RX ORDER — TRAMADOL HYDROCHLORIDE 50 MG/1
50 TABLET ORAL
Qty: 30 TAB | Refills: 0 | Status: SHIPPED | OUTPATIENT
Start: 2020-05-07 | End: 2020-05-17

## 2020-05-07 NOTE — PROGRESS NOTES
Since your last office visit have you had any    1. New medical diagnoses No  2. Changes in your medications  No  3. Surgical procedures No  4. Changes in your Allergies to medication No  5. What is your pain level today 10/10      Yue Flores  1965   No chief complaint on file. Yue Flores is a 53 y.o. female who was seen by synchronous (real-time) audio-video technology on 5/7/2020  the provider was home when visit was conducted. The patient was at their home and Doxy. me was used to conduct the visit. I sent 15 minutes on the call with the patient during this visit. HISTORY OF PRESENT ILLNESS  Yue Flores is a 53 y.o. female who presents today for reevaluation of bilateral knee pain. She has a history of bilateral knee arthritis. She has had cortisone and HA injections which do not provide much relief. She is continuing to have a lot of knee pain left worse than right. Her pain achy and sharp, located globally about the knee. She also has a lot of pain behind her knee. Worse with prolong walking and standing. She also has stiffness of the knees. She reports the cortisone injection giving about a month of relief. She has also used volteran gel, aleve, aspirin and tylenol which do not help her pain. She has gotten to the point where she can't even get to the bathroom without having extreme pain. She would like to discuss possible surgery for her knees. Patient denies any fever, chills, chest pain, shortness of breath or calf pain. There are no new illness or injuries to report since last seen in the office. No changes in medications, allergies, social or family history. PHYSICAL EXAM:   There were no vitals taken for this visit. The patient is a well-developed, well-nourished female   in no acute distress. The patient is alert and oriented times three.  Mood and affect are normal.  LYMPHATIC: lymph nodes are not enlarged and are within normal limits  SKIN: normal in color and non tender to palpation. There are no bruises or abrasions noted. NEUROLOGICAL: Motor sensory exam is within normal limits. Reflexes are equal bilaterally. There is normal sensation to pinprick and light touch  MUSCULOSKELETAL:  Bilateral knees   Right no obvious effusion, skin intact, ROM 0-120 approximately, pt reports tenderness globally about the knee   Left no obvious effusion, skin intact, ROM 0-120 approximately, pt reports tenderness globally about the knee     Due to this being a TeleHealth evaluation, many elements of the physical examination are unable to be assessed. IMAGING: 3 views of both knees 3/4/20 show moderate/severe arthritis of the medial compartment. Mild arthritis of the patellofemoral compartment. Varus deformity noted. IMPRESSION:      ICD-10-CM ICD-9-CM    1. Bilateral primary osteoarthritis of knee M17.0 715.16         PLAN:   Pt having bilateral knee pain  I think her pain is coming from arthritis. Will prescribe one time rx for tramadol to help with pain. Will also prescribe diclofenac to help with pain and swelling. It is to soon for another cortisone injection. She can return to the office in 3-4 weeks for one. Will refer her to Dr. Lovell Scheuermann to discuss treatment and possible surgery for her knees since conservative treatments have not provided relief of symptoms. RTC 4 weeks for cortisone injection    We discussed the expected course, resolution and complications of the diagnosis(es) in detail. Medication risks, benefits, costs, interactions, and alternatives were discussed as indicated. I advised her to contact the office if her condition worsens, changes or fails to improve as anticipated. She expressed understanding with the diagnosis(es) and plan.      CPT Codes 52555-31595 for Established Patients may apply to this Telehealth Visit  Time-based coding, delete if not needed: I spent at least 15 minutes with this established patient, and >50% of the time was spent counseling and/or coordinating care regarding. Pursuant to the emergency declaration under the [facility] waiver authority and the [employer] Act, this Virtual  Visit was conducted, with patient's consent, to reduce the patient's risk of exposure to COVID-19 and provide continuity of care for an established patient. Services were provided through a video synchronous discussion virtually to substitute for in-person clinic visit.     [facility] and Spine Specialist

## 2020-06-29 ENCOUNTER — VIRTUAL VISIT (OUTPATIENT)
Dept: ORTHOPEDIC SURGERY | Facility: CLINIC | Age: 55
End: 2020-06-29

## 2020-07-22 RX ORDER — DICLOFENAC SODIUM 75 MG/1
TABLET, DELAYED RELEASE ORAL
Qty: 60 TAB | Refills: 2 | Status: SHIPPED | OUTPATIENT
Start: 2020-07-22 | End: 2020-10-23

## 2020-09-16 ENCOUNTER — OFFICE VISIT (OUTPATIENT)
Dept: ORTHOPEDIC SURGERY | Age: 55
End: 2020-09-16

## 2020-09-16 VITALS
TEMPERATURE: 97.3 F | BODY MASS INDEX: 31.25 KG/M2 | DIASTOLIC BLOOD PRESSURE: 94 MMHG | RESPIRATION RATE: 16 BRPM | WEIGHT: 155 LBS | HEART RATE: 71 BPM | HEIGHT: 59 IN | SYSTOLIC BLOOD PRESSURE: 142 MMHG | OXYGEN SATURATION: 99 %

## 2020-09-16 DIAGNOSIS — M71.22 SYNOVIAL CYST OF LEFT POPLITEAL SPACE: ICD-10-CM

## 2020-09-16 DIAGNOSIS — M17.11 PRIMARY OSTEOARTHRITIS OF RIGHT KNEE: ICD-10-CM

## 2020-09-16 DIAGNOSIS — G89.29 CHRONIC PAIN OF RIGHT KNEE: ICD-10-CM

## 2020-09-16 DIAGNOSIS — M25.562 CHRONIC PAIN OF LEFT KNEE: ICD-10-CM

## 2020-09-16 DIAGNOSIS — M25.561 CHRONIC PAIN OF RIGHT KNEE: ICD-10-CM

## 2020-09-16 DIAGNOSIS — M17.12 PRIMARY OSTEOARTHRITIS OF LEFT KNEE: Primary | ICD-10-CM

## 2020-09-16 DIAGNOSIS — G89.29 CHRONIC PAIN OF LEFT KNEE: ICD-10-CM

## 2020-09-16 RX ORDER — BETAMETHASONE SODIUM PHOSPHATE AND BETAMETHASONE ACETATE 3; 3 MG/ML; MG/ML
6 INJECTION, SUSPENSION INTRA-ARTICULAR; INTRALESIONAL; INTRAMUSCULAR; SOFT TISSUE ONCE
Qty: 0.5 ML | Refills: 0
Start: 2020-09-16 | End: 2020-09-16

## 2020-09-16 NOTE — PROGRESS NOTES
Patient: John Paul Tanner                MRN: [id number]       SSN: [id number]  YOB: 1965        AGE: 53 y.o. SEX: female    PCP: Tish Olvera MD  09/16/20    Chief Complaint   Patient presents with    Knee Pain     Bilateral knee pain. HISTORY:  John Paul Tanner is a 53 y.o. female who is seen for bilateral knee pain L>R. She has been experiencing bilateral knee pain for the past several months. She does not recall any injury. She feels lateral and popliteal pain. She feels pain with standing, walking and stair climbing. She experiences  startup pain after sitting. She has been treated by Dr. Clement Thornton and RAZA Garner in the past.   She has responded to past cortisone injections but her previous viscosupplementation series did not help as much as she would have liked. She has been treated by Dr. Syed Pollock for her back pain. Pain Assessment  9/16/2020   Location of Pain Knee   Location Modifiers Right;Left   Severity of Pain 4   Quality of Pain Popping; Other (Comment)   Quality of Pain Comment pulling   Duration of Pain Persistent   Frequency of Pain Constant   Aggravating Factors Walking;Standing   Aggravating Factors Comment -   Limiting Behavior Yes   Relieving Factors Nothing   Relieving Factors Comment -   Result of Injury No     Occupation, etc:  Ms. Jose Maria Booker receives social security disability benefits for chronic back pain. 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 She started to receive disability 5 years ago. She lives in [place] with her 26 yo daughter and 44 yo son. She has another daughter and 13 grandchildren. Her first great grandchild is on the way. She has severe anxiety for which she takes medication. She is not diabetic. She is hypertensive. She has COPD. She does not have a vehicle and needs people to drive her to her appointments. Ms. Jose Maria Booker weighs 155 lbs and is 4'11\" tall. She is needle phobic.     No results found for: HBA1C, HGBE8, CDE6XJBI, LEN6YVPZ, WBL2IXEZ  Weight Metrics 9/16/2020 3/4/2020 1/15/2020 1/8/2020 6/5/2019 3/4/2019 2/15/2019   Weight 155 lb 172 lb 3.2 oz 171 lb 12.8 oz 172 lb 171 lb 6.4 oz 171 lb 173 lb 9.6 oz   BMI 31.31 kg/m2 34.78 kg/m2 34.7 kg/m2 34.74 kg/m2 34.62 kg/m2 34.54 kg/m2 35.06 kg/m2       Patient Active Problem List   Diagnosis Code    Lumbar herniated disc M51.26    Lumbar neuritis M54.16    Other intervertebral disc degeneration, lumbar region M51.36    Severe obesity (HCC) E66.01     REVIEW OF SYSTEMS:    Constitutional Symptoms: Negative   Eyes: Negative   Ears, Nose, Throat and Mouth: Negative   Cardiovascular: Negative   Respiratory: Negative   Genitourinary: Per HPI   Gastrointestinal: Per HPI   Integumentary (Skin and/or Breast): Negative   Musculoskeletal: Per HPI   Endocrine/Rheumatologic: Negative   Neurological: Per HPI   Hematology/Lymphatic: Negative    Allergic/Immunologic: Negative   Phychiatric: Negative    Social History     Socioeconomic History    Marital status: LEGALLY      Spouse name: Not on file    Number of children: Not on file    Years of education: Not on file    Highest education level: Not on file   Occupational History    Not on file   Social Needs    Financial resource strain: Not on file    Food insecurity     Worry: Not on file     Inability: Not on file    Transportation needs     Medical: Not on file     Non-medical: Not on file   Tobacco Use    Smoking status: Current Every Day Smoker     Packs/day: 1.00    Smokeless tobacco: Never Used   Substance and Sexual Activity    Alcohol use: No    Drug use: Not on file    Sexual activity: Not on file   Lifestyle    Physical activity     Days per week: Not on file     Minutes per session: Not on file    Stress: Not on file   Relationships    Social connections     Talks on phone: Not on file     Gets together: Not on file     Attends Yazidi service: Not on file     Active member of club or organization: Not on file     Attends meetings of clubs or organizations: Not on file     Relationship status: Not on file    Intimate partner violence     Fear of current or ex partner: Not on file     Emotionally abused: Not on file     Physically abused: Not on file     Forced sexual activity: Not on file   Other Topics Concern     Service Not Asked    Blood Transfusions Not Asked    Caffeine Concern Not Asked    Occupational Exposure Not Asked   [place] Hazards Not Asked    Sleep Concern Not Asked    Stress Concern Not Asked    Weight Concern Not Asked    Special Diet Not Asked    Back Care Not Asked    Exercise Not Asked    Bike Helmet Not Asked   [address] Not Asked    Self-Exams Not Asked   Social History Narrative    Not on file      No Known Allergies   Current Outpatient Medications   Medication Sig    ARIPiprazole (ABILIFY) 5 mg tablet     aspirin delayed-release 81 mg tablet Take 81 mg by mouth.  atorvastatin (LIPITOR) 40 mg tablet Take 40 mg by mouth.  traZODone (DESYREL) 150 mg tablet take 1 tablet by mouth at bedtime    lamoTRIgine (LAMICTAL) 100 mg tablet Take  by mouth daily.  lamoTRIgine (LAMICTAL) 25 mg tablet take 1 tablet by mouth at bedtime for 2 weeks then INCREASE TO 50 MG AT BEDTIME    amLODIPine (NORVASC) 2.5 mg tablet     montelukast (SINGULAIR) 10 mg tablet     PROVENTIL HFA 90 mcg/actuation inhaler INHALE 2 PUFFS PO QID PRN    cyclobenzaprine (FLEXERIL) 10 mg tablet TK 1 T PO Q 8 H PRF BACK PAIN    hydroCHLOROthiazide (HYDRODIURIL) 25 mg tablet TAKE 1 T PO QD    topiramate (TOPAMAX) 25 mg tablet 3 tabs PO QHS    diclofenac EC (VOLTAREN) 75 mg EC tablet take 1 tablet by mouth twice a day with meals    SYMBICORT 160-4.5 mcg/actuation HFAA     olmesartan (BENICAR) 5 mg tablet     diclofenac (VOLTAREN) 1 % gel Apply 2 g to affected area four (4) times daily.  lurasidone (LATUDA) 80 mg tab tablet Take  by mouth.     diazePAM (VALIUM) 10 mg tablet Take 1 tab by mouth as directed by nurse prior to procedure    OXcarbazepine (TRILEPTAL) 300 mg tablet take 1 tablet by mouth every morning and 2 tablets at bedtime    BELSOMRA 15 mg tablet     LATUDA 20 mg tab tablet take 1 tablet by mouth WITH DINNER for 1 week then INCREASE TO 40 MG WITH DINNER    mirtazapine (REMERON) 15 mg tablet     QUEtiapine (SEROQUEL) 50 mg tablet take 1/2 to 1 tablet by mouth at bedtime    pregabalin (LYRICA) 75 mg capsule Take 1 Cap by mouth two (2) times a day. Max Daily Amount: 150 mg. (Patient not taking: Reported on 5/22/2018)    pregabalin (LYRICA) 50 mg capsule Take 1 Cap by mouth two (2) times a day. Max Daily Amount: 100 mg. (Patient not taking: Reported on 5/22/2018)    allopurinol (ZYLOPRIM) 300 mg tablet TK 1 T PO QD    ALPRAZolam (XANAX) 0.5 mg tablet TAKE 1 TO 2 T PO TID PRN FOR 30 DAYS    citalopram (CELEXA) 40 mg tablet TK 1 T PO QD    clonazePAM (KLONOPIN) 1 mg tablet TK 1 T PO Q 8 H PRN    diclofenac EC (VOLTAREN) 75 mg EC tablet TK 1 T PO BID    gabapentin (NEURONTIN) 300 mg capsule TK 7 CS PO DAILY FOR 30 DAYS    hydrOXYzine pamoate (VISTARIL) 25 mg capsule TK 1 C PO Q 12 H PRF ANXIETY FOR 30 DAYS    simvastatin (ZOCOR) 20 mg tablet TK 1 T PO QHS    sucralfate (CARAFATE) 1 gram tablet TAKE 1 T PO QID BEFORE MEALS AND AT BEDTIME     No current facility-administered medications for this visit. PHYSICAL EXAMINATION:  Visit Vitals  BP (!) 142/94 (BP 1 Location: Right arm, BP Patient Position: Sitting)   Pulse 71   Temp 97.3 °F (36.3 °C)   Resp 16   Ht 4' 11\" (1.499 m)   Wt 155 lb (70.3 kg)   SpO2 99%   BMI 31.31 kg/m²    Appearance: Alert, well appearing and pleasant patient who is in no distress, oriented to person, place/time, and who follows commands. HEENT: Kingston Clement hears well, does not require hearing aids. Her sclera of the eyes are non-icteric. She is breathing normally and no respiratory accessory muscle use is noted.  No JVD present and Neck ROM within normal limits. Psychiatric: Flat affect. Oriented x3  Cardiovascular/Peripheral Vascular: Normal pulses to each foot. Integumentary: No rashes. Warm and normal color. No drainage. Gait: slight limp  Sensory Exam: Intact/Normal Sensation    Lymphatic: No evidence of Lymphedema  Vascular:       Pulses: palpable  Varicosities none  Wounds/Abrasion: None Present  Neuro: Negative, no tremors  ORTHO EXAMINATION:  Examination Right knee Left knee   Skin Intact Intact   Range of motion 120-0 120-0   Effusion - -   Medial joint line tenderness - -   Lateral joint line tenderness + +   Popliteal tenderness - -   Osteophytes palpable - -   Randys - -   Patella crepitus - -   Anterior drawer - -   Lateral laxity - -   Medial laxity - -   Varus deformity + +   Valgus deformity - -   Pretibial edema - -   Calf tenderness - -        TIME OUT:  Chart reviewed for the following:   I, Kathy Ceballos MD, have reviewed the History, Physical and updated the Allergic reactions for [employer]   TIME OUT performed immediately prior to start of procedure:  Vikash Bee MD, have performed the following reviews on [employer] prior to the start of the procedure:          * Patient was identified by name and date of birth   * Agreement on procedure being performed was verified  * Risks and Benefits explained to the patient  * Procedure site verified and marked as necessary  * Patient was positioned for comfort  * Consent was obtained     Time: 9:35 AM     Date of procedure: 9/16/2020  Procedure performed by:  Kathy Ceballos MD  Ms. Morataya tolerated the procedure well with no complications. RADIOGRAPHS:  XR BILAT KNEE 3/4/20 GILMER  -I have independently reviewed these images during this office visit. -Dr. Rocky Carmona:  Three views - No fractures, no effusion, severe medial joint space narrowing, + osteophytes present. Kellgren Elmer grade 4.      IMPRESSION: ICD-10-CM ICD-9-CM    1. Primary osteoarthritis of left knee  M17.12 715.16 betamethasone (Celestone Soluspan) 6 mg/mL injection      BETAMETHASONE ACETATE & SODIUM PHOSPHATE INJECTION 3 MG EA.      DRAIN/INJECT LARGE JOINT/BURSA   2. Chronic pain of left knee  M25.562 719.46 betamethasone (Celestone Soluspan) 6 mg/mL injection    G89.29 338.29 BETAMETHASONE ACETATE & SODIUM PHOSPHATE INJECTION 3 MG EA.      DRAIN/INJECT LARGE JOINT/BURSA   3. Primary osteoarthritis of right knee  M17.11 715.16    4. Chronic pain of right knee  M25.561 719.46     G89.29 338.29    5. Synovial cyst of left popliteal space  M71.22 727.51      PLAN:  After discussing treatment options, patient's left knee was injected with 4 cc Marcaine and 1/2 cc Celestone. She will call if she decides to do a Euflexxa series. We discussed possible need for left knee arthroplasty at some time in the future if pain continues. She will follow up as needed.       Scribed by Yoana Umanzor (Omayra Denny) as dictated by Stephie Trammell MD

## 2020-10-02 NOTE — PROGRESS NOTES
[facility]  [address], [place]   Phone: [phone]  Fax: [phone]        PROGRESS NOTE      HISTORY OF PRESENT ILLNESS:  The patient is a 53 y.o. female and was seen today for follow up of chronic progressive low back pain radiating into the BLE circumferentially to the feet with paraesthesias, with pain mostly localized on her bilateral knees (R=L). She additionally endorses right buttock pain. No specific injury/trauma. Pt reports bilateral foot drop and has had falls. Pt underwent bilateral L4/5 facet joint block on 2/27/18 without benefit. Pt previously took [address] Pt is currently taking Neurontin 300 mg QID. She notes she is intolerant to higher doses of Neurontin secondary to BUE/BLE edema. Pt is not a candidate for Cymbalta due to other medications she is currently prescribed. Per our records, pt did not tolerate TOPAMAX. Pt states Lisa Najera was causing her to have falls. Pt denies h/o lumbar spinal surgery. Per patient, a Dr. Raven Valentine told her she was not a surgical candidate. Pt denies h/o DM or alcoholism. Note from [facility] dated 7/5/15 indicates patient was on Methadone. Note from Dr. Laquita Betancourt for [facility] dated 5/4/15 indicates patient had tried injection therapy and medications x 6 months without benefit. Of note, he felt patient would be a good candidate for SCS trial. On 6/10/16, she underwent caudal block. Note from Dr. Laquita Betancourt,  for [facility] 9/16/16 indicating patient underwent bilateral L5 SNRBs (provided relief x 3 weeks). Of note, she was on Fentanyl, Percocet, Neurontin and Flexeril. Reviewing records from [facility], it appears as though the patient underwent more than three lumbar blocks in a 12-month period. Note from Dr. Jamison Velasquez 4/10/18 indicating patient was seen with c/o bilateral knee pain. Her primary complaint at that time was in the coccyx.  According to her note, x-rays of both knees revealed OA of both knees and an x-ray of the coccyx revealed questionable fracture. Note from Dr. Carmen Kelly dated 7/5/18 indicating patient was seen with c/o chronic back and leg pain. Pt had c/o weakness in her legs. Started her on Cymbalta. Set her up for EMG of BLE. Per pt, she has to pay balance before they will do an EMG. Note from Dr. Senthil Castro dated 2/15/19 indicating patient was seen with c/o bilateral leg and knee pain extending from her hips to her feet. She had injections in the past. She was taking Lyrica occasionally at that time. Referred back to us. Lumbar spine XR dated 1/19/15 per report revealed degenerated L3-4 and L4-5 discs, with slight progression at L5-S1. Lower lumbar facet arthropathy with progression and with interval development of minimal grade 1 anterolisthesis at L4-5 on a degenerative basis. A BLE EMG dated 10/27/17 reviewed. Report was essentially within the normal limits. Lumbar spine MRI dated 2/13/18 reviewed. Per report, stable MRI of the lumbar spine since prior exam 8-2016. Mild L4-5 central stenosis, from grade 1 degenerative spondylolisthesis with moderate bilateral facet joint osteoarthritis and ligamentum flavum thickening, minimal disc bulge. Eccentric and moderate right L5-S1 foraminal stenosis from minimal disc bulge with eccentric left lateral spondylosis and mild asymmetric right facet joint osteoarthritis. Associated impingement on the left existing L5 nerve root. Minimal L4-5 and L5-S1 disc bulges. Multilevel lower lumbar facet joint osteoarthritis as above. 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 The patient is RHD. At her last clinic appointment, clinically, she demonstrated sxs consistent with right SI joint dysfunction. Patient wished to proceed with a right SI joint injection for diagnostic purposes.  I recommended she attend her appointment at chronic pain management as scheduled.     The patient returns today and reports pain location and distribution remains unchanged. She rates her pain 10/10, previously 9/10. Pt was last seen by me on 3/4/2019. Pt underwent SI joint injection on 3/12/2019. She failed to f/u after block. She recalls some relief with the block x 2 weeks. Pt did not follow through with pain management. Pt denies change in bowel or bladder habits. Note from Dr. Darnell Desai dated 9/16/2020 indicating patient was seen with c/o bilateral knee pain. Pt received bilateral knee injections.  reviewed. Body mass index is 31.91 kg/m².     PCP: Charles Canales MD      Past Medical History:   Diagnosis Date    Asthma     Cancer [facility]) 2015    cervical cancer    Depression     Hypertension     TIA (transient ischemic attack) 09/20/2019        Social History     Socioeconomic History    Marital status: LEGALLY      Spouse name: Not on file    Number of children: Not on file    Years of education: Not on file    Highest education level: Not on file   Occupational History    Not on file   Social Needs    Financial resource strain: Not on file    Food insecurity     Worry: Not on file     Inability: Not on file    Transportation needs     Medical: Not on file     Non-medical: Not on file   Tobacco Use    Smoking status: Current Every Day Smoker     Packs/day: 1.00    Smokeless tobacco: Never Used   Substance and Sexual Activity    Alcohol use: No    Drug use: Not on file    Sexual activity: Not on file   Lifestyle    Physical activity     Days per week: Not on file     Minutes per session: Not on file    Stress: Not on file   Relationships    Social connections     Talks on phone: Not on file     Gets together: Not on file     Attends Rastafari service: Not on file     Active member of club or organization: Not on file     Attends meetings of clubs or organizations: Not on file     Relationship status: Not on file    Intimate partner violence     Fear of current or ex partner: Not on file Emotionally abused: Not on file     Physically abused: Not on file     Forced sexual activity: Not on file   Other Topics Concern     Service Not Asked    Blood Transfusions Not Asked    Caffeine Concern Not Asked    Occupational Exposure Not Asked    Hobby Hazards Not Asked    Sleep Concern Not Asked    Stress Concern Not Asked    Weight Concern Not Asked    Special Diet Not Asked    Back Care Not Asked    Exercise Not Asked    Bike Helmet Not Asked   [address] Not Asked    Self-Exams Not Asked   Social History Narrative    Not on file       Current Outpatient Medications   Medication Sig Dispense Refill    omeprazole (PRILOSEC) 20 mg capsule take 1 capsule by mouth AT LEAST 30 MINUTES PRIOR TO EATING DINNER      SYMBICORT 160-4.5 mcg/actuation HFAA       ARIPiprazole (ABILIFY) 5 mg tablet       aspirin delayed-release 81 mg tablet Take 81 mg by mouth.  atorvastatin (LIPITOR) 40 mg tablet Take 40 mg by mouth.  traZODone (DESYREL) 150 mg tablet take 1 tablet by mouth at bedtime  0    lamoTRIgine (LAMICTAL) 100 mg tablet Take  by mouth daily.  montelukast (SINGULAIR) 10 mg tablet   0    PROVENTIL HFA 90 mcg/actuation inhaler INHALE 2 PUFFS PO QID PRN  1    cyclobenzaprine (FLEXERIL) 10 mg tablet TK 1 T PO Q 8 H PRF BACK PAIN  1    hydroCHLOROthiazide (HYDRODIURIL) 25 mg tablet TAKE 1 T PO QD  6    diclofenac EC (VOLTAREN) 75 mg EC tablet take 1 tablet by mouth twice a day with meals 60 Tab 2    olmesartan (BENICAR) 5 mg tablet   0    diclofenac (VOLTAREN) 1 % gel Apply 2 g to affected area four (4) times daily. 100 g 2    lurasidone (LATUDA) 80 mg tab tablet Take  by mouth.       diazePAM (VALIUM) 10 mg tablet Take 1 tab by mouth as directed by nurse prior to procedure 1 Tab 0    OXcarbazepine (TRILEPTAL) 300 mg tablet take 1 tablet by mouth every morning and 2 tablets at bedtime  0    BELSOMRA 15 mg tablet   0    lamoTRIgine (LAMICTAL) 25 mg tablet take 1 tablet by mouth at bedtime for 2 weeks then INCREASE TO 50 MG AT BEDTIME  0    amLODIPine (NORVASC) 2.5 mg tablet   0    LATUDA 20 mg tab tablet take 1 tablet by mouth WITH DINNER for 1 week then INCREASE TO 40 MG WITH DINNER  0    mirtazapine (REMERON) 15 mg tablet   0    QUEtiapine (SEROQUEL) 50 mg tablet take 1/2 to 1 tablet by mouth at bedtime  0    pregabalin (LYRICA) 75 mg capsule Take 1 Cap by mouth two (2) times a day. Max Daily Amount: 150 mg. (Patient not taking: Reported on 5/22/2018) 60 Cap 1    pregabalin (LYRICA) 50 mg capsule Take 1 Cap by mouth two (2) times a day. Max Daily Amount: 100 mg. (Patient not taking: Reported on 5/22/2018) 60 Cap 0    allopurinol (ZYLOPRIM) 300 mg tablet TK 1 T PO QD  6    ALPRAZolam (XANAX) 0.5 mg tablet TAKE 1 TO 2 T PO TID PRN FOR 30 DAYS  2    citalopram (CELEXA) 40 mg tablet TK 1 T PO QD  3    clonazePAM (KLONOPIN) 1 mg tablet TK 1 T PO Q 8 H PRN  0    diclofenac EC (VOLTAREN) 75 mg EC tablet TK 1 T PO BID  1    gabapentin (NEURONTIN) 300 mg capsule TK 7 CS PO DAILY FOR 30 DAYS  1    hydrOXYzine pamoate (VISTARIL) 25 mg capsule TK 1 C PO Q 12 H PRF ANXIETY FOR 30 DAYS  0    simvastatin (ZOCOR) 20 mg tablet TK 1 T PO QHS  4    sucralfate (CARAFATE) 1 gram tablet TAKE 1 T PO QID BEFORE MEALS AND AT BEDTIME  6    topiramate (TOPAMAX) 25 mg tablet 3 tabs PO QHS 90 Tab 1       No Known Allergies       PHYSICAL EXAMINATION    Visit Vitals  BP (!) 145/91 (BP 1 Location: Right arm, BP Patient Position: Sitting)   Pulse 84   Temp 97.3 °F (36.3 °C)   Resp 20   Ht 4' 11\" (1.499 m)   Wt 158 lb (71.7 kg)   SpO2 98%   BMI 31.91 kg/m²       CONSTITUTIONAL: NAD, A&O x 3  SENSATION: Intact to light touch throughout  RANGE OF MOTION: The patient has full passive range of motion in all four extremities. MOTOR:  Straight Leg Raise: Negative, bilateral    Examined in a wheelchair.                Hip Flex Knee Ext Knee Flex Ankle DF GTE Ankle PF Tone   Right +4/5 +4/5 +4/5 +4/5 +4/5 +4/5 +4/5   Left +4/5 +4/5 +4/5 +4/5 +4/5 +4/5 +4/5       ASSESSMENT   Diagnoses and all orders for this visit:    1. Lumbar radiculopathy  -     REFERRAL TO PAIN MANAGEMENT    2. Other intervertebral disc degeneration, lumbar region  -     REFERRAL TO PAIN MANAGEMENT    3. Sacroiliac joint dysfunction of right side  -     REFERRAL TO PAIN MANAGEMENT      IMPRESSION AND PLAN:  Patient returns to the office today with c/o low back pain radiating into the BLE circumferentially to the feet with paraesthesias. Multiple treatment options were discussed. Pt was previously referred to pain management and failed to follow through. She reports the same sxs that she was having when she was previously seen by me. She reports nothing has changed. Pt had minimal relief with SI joint injection. I discussed with the pt getting new studies to see if things had progressed to the point of surgery. Pt did not wish to proceed in that direction. I will refer her back to pain management. Patient is neurologically intact. I will see the patient back prn. Written by Celeste Velasquez, as dictated by Marly Newberry MD  I examined the patient, reviewed and agree with the note.

## 2020-10-05 ENCOUNTER — OFFICE VISIT (OUTPATIENT)
Dept: ORTHOPEDIC SURGERY | Age: 55
End: 2020-10-05
Payer: MEDICAID

## 2020-10-05 VITALS
SYSTOLIC BLOOD PRESSURE: 145 MMHG | DIASTOLIC BLOOD PRESSURE: 91 MMHG | HEIGHT: 59 IN | TEMPERATURE: 97.3 F | HEART RATE: 84 BPM | WEIGHT: 158 LBS | BODY MASS INDEX: 31.85 KG/M2 | RESPIRATION RATE: 20 BRPM | OXYGEN SATURATION: 98 %

## 2020-10-05 DIAGNOSIS — M51.36 OTHER INTERVERTEBRAL DISC DEGENERATION, LUMBAR REGION: ICD-10-CM

## 2020-10-05 DIAGNOSIS — M54.16 LUMBAR RADICULOPATHY: Primary | ICD-10-CM

## 2020-10-05 DIAGNOSIS — M53.3 SACROILIAC JOINT DYSFUNCTION OF RIGHT SIDE: ICD-10-CM

## 2020-10-05 PROCEDURE — 99213 OFFICE O/P EST LOW 20 MIN: CPT | Performed by: PHYSICAL MEDICINE & REHABILITATION

## 2020-10-05 RX ORDER — OMEPRAZOLE 20 MG/1
CAPSULE, DELAYED RELEASE ORAL
COMMUNITY
Start: 2020-09-21

## 2020-10-05 NOTE — LETTER
10/5/20 Patient: Tori Gerber YOB: 1965 Date of Visit: 10/5/2020 Len Decker MD 
[address] [address] VIA Facsimile: [phone] Dear Len Decker MD, Thank you for referring Ms. Dell Arias to [address] for evaluation. My notes for this consultation are attached. If you have questions, please do not hesitate to call me. I look forward to following your patient along with you. Sincerely, Adelaide Hammond MD

## 2020-10-23 RX ORDER — DICLOFENAC SODIUM 75 MG/1
TABLET, DELAYED RELEASE ORAL
Qty: 180 TAB | Refills: 2 | Status: SHIPPED | OUTPATIENT
Start: 2020-10-23

## 2020-11-11 ENCOUNTER — OFFICE VISIT (OUTPATIENT)
Dept: ORTHOPEDIC SURGERY | Age: 55
End: 2020-11-11
Payer: MEDICARE

## 2020-11-11 VITALS
BODY MASS INDEX: 32.86 KG/M2 | WEIGHT: 163 LBS | SYSTOLIC BLOOD PRESSURE: 128 MMHG | HEART RATE: 92 BPM | DIASTOLIC BLOOD PRESSURE: 84 MMHG | TEMPERATURE: 97.7 F | HEIGHT: 59 IN | RESPIRATION RATE: 16 BRPM

## 2020-11-11 DIAGNOSIS — M25.562 CHRONIC PAIN OF LEFT KNEE: ICD-10-CM

## 2020-11-11 DIAGNOSIS — G89.29 CHRONIC PAIN OF LEFT KNEE: ICD-10-CM

## 2020-11-11 DIAGNOSIS — G89.29 CHRONIC PAIN OF RIGHT KNEE: ICD-10-CM

## 2020-11-11 DIAGNOSIS — M17.12 PRIMARY OSTEOARTHRITIS OF LEFT KNEE: Primary | ICD-10-CM

## 2020-11-11 DIAGNOSIS — M17.11 PRIMARY OSTEOARTHRITIS OF RIGHT KNEE: ICD-10-CM

## 2020-11-11 DIAGNOSIS — M25.561 CHRONIC PAIN OF RIGHT KNEE: ICD-10-CM

## 2020-11-11 PROCEDURE — G8417 CALC BMI ABV UP PARAM F/U: HCPCS | Performed by: SPECIALIST

## 2020-11-11 PROCEDURE — G8427 DOCREV CUR MEDS BY ELIG CLIN: HCPCS | Performed by: SPECIALIST

## 2020-11-11 PROCEDURE — 3017F COLORECTAL CA SCREEN DOC REV: CPT | Performed by: SPECIALIST

## 2020-11-11 PROCEDURE — G8432 DEP SCR NOT DOC, RNG: HCPCS | Performed by: SPECIALIST

## 2020-11-11 PROCEDURE — 20610 DRAIN/INJ JOINT/BURSA W/O US: CPT | Performed by: SPECIALIST

## 2020-11-11 PROCEDURE — 99213 OFFICE O/P EST LOW 20 MIN: CPT | Performed by: SPECIALIST

## 2020-11-11 RX ORDER — BETAMETHASONE SODIUM PHOSPHATE AND BETAMETHASONE ACETATE 3; 3 MG/ML; MG/ML
6 INJECTION, SUSPENSION INTRA-ARTICULAR; INTRALESIONAL; INTRAMUSCULAR; SOFT TISSUE ONCE
Status: COMPLETED | OUTPATIENT
Start: 2020-11-11 | End: 2020-11-11

## 2020-11-11 RX ORDER — VENLAFAXINE HYDROCHLORIDE 37.5 MG/1
CAPSULE, EXTENDED RELEASE ORAL
COMMUNITY
Start: 2020-10-16

## 2020-11-11 RX ADMIN — BETAMETHASONE SODIUM PHOSPHATE AND BETAMETHASONE ACETATE 6 MG: 3; 3 INJECTION, SUSPENSION INTRA-ARTICULAR; INTRALESIONAL; INTRAMUSCULAR; SOFT TISSUE at 11:56

## 2020-11-11 NOTE — PROGRESS NOTES
Patient: Tori Gerber                MRN: [id number]       SSN: [id number]  YOB: 1965        AGE: 63 y.o. SEX: female    PCP: Clementina Escobar MD  11/11/20    Chief Complaint   Patient presents with    Knee Pain     bilateral     HISTORY:  Tori Gerber is a 63 y.o. female who is seen for bilateral knee pain L>R. She has been experiencing bilateral knee pain for the past several months. She does not recall any injury. She feels lateral and popliteal pain. She feels pain with standing, walking and stair climbing. She experiences  startup pain after sitting and waking up in the morning. She has been treated by Dr. Leland Gunter and RAZA Salinas in the past.   She has responded to past cortisone injections. She even has difficulty doing her grocery shopping. She has been treated by Dr. Gin Cole for her back pain. Pain Assessment  11/11/2020   Location of Pain Knee   Location Modifiers Left;Right   Severity of Pain 10   Quality of Pain Sharp; Other (Comment)   Quality of Pain Comment stiff   Duration of Pain Persistent   Frequency of Pain Constant   Aggravating Factors Walking;Standing; Other (Comment)   Aggravating Factors Comment sitting to standing   Limiting Behavior Yes   Relieving Factors Rest   Relieving Factors Comment -   Result of Injury -     Occupation, etc:  Ms. Nadine Kumar receives social security disability benefits for chronic back pain. 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 She started to receive disability 5 years ago. She lives in [place] with her 28 yo daughter and 44 yo son. She has another daughter and 13 grandchildren. Her first great grandson is on the way. She has severe anxiety for which she takes medication. She is not diabetic. She is hypertensive. She has COPD. She does not have a vehicle and needs people to drive her to her appointments. Ms. Nadine Kumar weighs 163 lbs and is 4'11\" tall. She is needle phobic.     No results found for:  HBA1C, HGBE8, EGT1TUSW, BUA2QOVE, ZBZ3AVZJ  Weight Metrics 11/11/2020 10/5/2020 9/16/2020 3/4/2020 1/15/2020 1/8/2020 6/5/2019   Weight 163 lb 158 lb 155 lb 172 lb 3.2 oz 171 lb 12.8 oz 172 lb 171 lb 6.4 oz   BMI 32.92 kg/m2 31.91 kg/m2 31.31 kg/m2 34.78 kg/m2 34.7 kg/m2 34.74 kg/m2 34.62 kg/m2       Patient Active Problem List   Diagnosis Code    Lumbar herniated disc M51.26    Lumbar neuritis M54.16    Other intervertebral disc degeneration, lumbar region M51.36    Severe obesity (HCC) E66.01     REVIEW OF SYSTEMS:    Constitutional Symptoms: Negative   Eyes: Negative   Ears, Nose, Throat and Mouth: Negative   Cardiovascular: Negative   Respiratory: Negative   Genitourinary: Per HPI   Gastrointestinal: Per HPI   Integumentary (Skin and/or Breast): Negative   Musculoskeletal: Per HPI   Endocrine/Rheumatologic: Negative   Neurological: Per HPI   Hematology/Lymphatic: Negative    Allergic/Immunologic: Negative   Phychiatric: Negative    Social History     Socioeconomic History    Marital status: LEGALLY      Spouse name: Not on file    Number of children: Not on file    Years of education: Not on file    Highest education level: Not on file   Occupational History    Not on file   Social Needs    Financial resource strain: Not on file    Food insecurity     Worry: Not on file     Inability: Not on file    Transportation needs     Medical: Not on file     Non-medical: Not on file   Tobacco Use    Smoking status: Current Every Day Smoker     Packs/day: 1.00    Smokeless tobacco: Never Used   Substance and Sexual Activity    Alcohol use: No    Drug use: Not on file    Sexual activity: Not on file   Lifestyle    Physical activity     Days per week: Not on file     Minutes per session: Not on file    Stress: Not on file   Relationships    Social connections     Talks on phone: Not on file     Gets together: Not on file     Attends Catholic service: Not on file     Active member of club or organization: Not on file     Attends meetings of clubs or organizations: Not on file     Relationship status: Not on file    Intimate partner violence     Fear of current or ex partner: Not on file     Emotionally abused: Not on file     Physically abused: Not on file     Forced sexual activity: Not on file   Other Topics Concern     Service Not Asked    Blood Transfusions Not Asked    Caffeine Concern Not Asked    Occupational Exposure Not Asked    Hobby Hazards Not Asked    Sleep Concern Not Asked    Stress Concern Not Asked    Weight Concern Not Asked    Special Diet Not Asked    Back Care Not Asked    Exercise Not Asked    Bike Helmet Not Asked   [address] [facility] Not Asked    Self-Exams Not Asked   Social History Narrative    Not on file      No Known Allergies   Current Outpatient Medications   Medication Sig    venlafaxine-SR (EFFEXOR-XR) 37.5 mg capsule take 1 capsule by mouth once daily    diclofenac EC (VOLTAREN) 75 mg EC tablet take 1 tablet by mouth twice a day    omeprazole (PRILOSEC) 20 mg capsule take 1 capsule by mouth AT LEAST 30 MINUTES PRIOR TO EATING DINNER    SYMBICORT 160-4.5 mcg/actuation HFAA     ARIPiprazole (ABILIFY) 5 mg tablet     aspirin delayed-release 81 mg tablet Take 81 mg by mouth.  atorvastatin (LIPITOR) 40 mg tablet Take 40 mg by mouth.  traZODone (DESYREL) 150 mg tablet take 1 tablet by mouth at bedtime    diclofenac (VOLTAREN) 1 % gel Apply 2 g to affected area four (4) times daily.  lamoTRIgine (LAMICTAL) 100 mg tablet Take  by mouth daily.     diazePAM (VALIUM) 10 mg tablet Take 1 tab by mouth as directed by nurse prior to procedure    lamoTRIgine (LAMICTAL) 25 mg tablet take 1 tablet by mouth at bedtime for 2 weeks then INCREASE TO 50 MG AT BEDTIME    amLODIPine (NORVASC) 2.5 mg tablet     montelukast (SINGULAIR) 10 mg tablet     PROVENTIL HFA 90 mcg/actuation inhaler INHALE 2 PUFFS PO QID PRN    ALPRAZolam (XANAX) 0.5 mg tablet TAKE 1 TO 2 T PO TID PRN FOR 30 DAYS    cyclobenzaprine (FLEXERIL) 10 mg tablet TK 1 T PO Q 8 H PRF BACK PAIN    hydroCHLOROthiazide (HYDRODIURIL) 25 mg tablet TAKE 1 T PO QD    topiramate (TOPAMAX) 25 mg tablet 3 tabs PO QHS    olmesartan (BENICAR) 5 mg tablet     lurasidone (LATUDA) 80 mg tab tablet Take  by mouth.  OXcarbazepine (TRILEPTAL) 300 mg tablet take 1 tablet by mouth every morning and 2 tablets at bedtime    BELSOMRA 15 mg tablet     LATUDA 20 mg tab tablet take 1 tablet by mouth WITH DINNER for 1 week then INCREASE TO 40 MG WITH DINNER    mirtazapine (REMERON) 15 mg tablet     QUEtiapine (SEROQUEL) 50 mg tablet take 1/2 to 1 tablet by mouth at bedtime    pregabalin (LYRICA) 75 mg capsule Take 1 Cap by mouth two (2) times a day. Max Daily Amount: 150 mg. (Patient not taking: Reported on 5/22/2018)    pregabalin (LYRICA) 50 mg capsule Take 1 Cap by mouth two (2) times a day. Max Daily Amount: 100 mg. (Patient not taking: Reported on 5/22/2018)    allopurinol (ZYLOPRIM) 300 mg tablet TK 1 T PO QD    citalopram (CELEXA) 40 mg tablet TK 1 T PO QD    clonazePAM (KLONOPIN) 1 mg tablet TK 1 T PO Q 8 H PRN    diclofenac EC (VOLTAREN) 75 mg EC tablet TK 1 T PO BID    gabapentin (NEURONTIN) 300 mg capsule TK 7 CS PO DAILY FOR 30 DAYS    hydrOXYzine pamoate (VISTARIL) 25 mg capsule TK 1 C PO Q 12 H PRF ANXIETY FOR 30 DAYS    simvastatin (ZOCOR) 20 mg tablet TK 1 T PO QHS    sucralfate (CARAFATE) 1 gram tablet TAKE 1 T PO QID BEFORE MEALS AND AT BEDTIME     No current facility-administered medications for this visit.        PHYSICAL EXAMINATION:  Visit Vitals  /84 (BP 1 Location: Right arm, BP Patient Position: Sitting)   Pulse 92   Temp 97.7 °F (36.5 °C)   Resp 16   Ht 4' 11\" (1.499 m)   Wt 163 lb (73.9 kg)   BMI 32.92 kg/m²       ORTHO EXAMINATION:  Examination Right knee Left knee   Skin Intact Intact   Range of motion 120-0 120-0   Effusion - -   Medial joint line tenderness - -   Lateral joint line tenderness + +   Popliteal tenderness - -   Osteophytes palpable - -   Randys - -   Patella crepitus - -   Anterior drawer - -   Lateral laxity - -   Medial laxity - -   Varus deformity + +   Valgus deformity - -   Pretibial edema - -   Calf tenderness - -        TIME OUT:  Chart reviewed for the following:   IYovani MD, have reviewed the History, Physical and updated the Allergic reactions for [facility] performed immediately prior to start of procedure:  Blair Abdalla MD, have performed the following reviews on Erickson Dudley prior to the start of the procedure:          * Patient was identified by name and date of birth   * Agreement on procedure being performed was verified  * Risks and Benefits explained to the patient  * Procedure site verified and marked as necessary  * Patient was positioned for comfort  * Consent was obtained     Time: 11:40 AM     Date of procedure: 11/11/2020  Procedure performed by:  Yovani Estrella MD  Ms. Morataya tolerated the procedure well with no complications. RADIOGRAPHS:  XR BILAT KNEE 3/4/20 GILMER  -I have independently reviewed these images during this office visit. -Dr. Sin Cleaves:  Three views - No fractures, no effusion, severe medial joint space narrowing, + osteophytes present. Kellgren Elmer grade 4. IMPRESSION:      ICD-10-CM ICD-9-CM    1. Primary osteoarthritis of left knee  M17.12 715.16 PROCEDURE AUTHORIZATION TO       DRAIN/INJECT LARGE JOINT/BURSA      betamethasone (CELESTONE) injection 6 mg   2. Chronic pain of left knee  M25.562 719.46 PROCEDURE AUTHORIZATION TO     G89.29 338.29 DRAIN/INJECT LARGE JOINT/BURSA      betamethasone (CELESTONE) injection 6 mg   3. Primary osteoarthritis of right knee  M17.11 715.16 PROCEDURE AUTHORIZATION TO       DRAIN/INJECT LARGE JOINT/BURSA      betamethasone (CELESTONE) injection 6 mg   4.  Chronic pain of right knee  M25.561 719.46 PROCEDURE AUTHORIZATION TO     G89.29 338.29 DRAIN/INJECT LARGE JOINT/BURSA      betamethasone (CELESTONE) injection 6 mg     PLAN:  Consider visco supplementation if pain continues. After discussing treatment options, patient's knees were reinjected with 4 cc Marcaine and 1/2 cc Celestone. We discussed possible need for left knee arthroplasty at some time in the future if pain continues. She will follow up as needed.       Scribed by Rubi Ascencio ([address]) as dictated by Stan Wood MD

## 2020-11-20 ENCOUNTER — DOCUMENTATION ONLY (OUTPATIENT)
Dept: ORTHOPEDIC SURGERY | Age: 55
End: 2020-11-20

## 2021-07-21 ENCOUNTER — OFFICE VISIT (OUTPATIENT)
Dept: ORTHOPEDIC SURGERY | Age: 56
End: 2021-07-21
Payer: MEDICARE

## 2021-07-21 VITALS
BODY MASS INDEX: 34.68 KG/M2 | WEIGHT: 172 LBS | HEIGHT: 59 IN | HEART RATE: 74 BPM | OXYGEN SATURATION: 98 % | RESPIRATION RATE: 15 BRPM

## 2021-07-21 DIAGNOSIS — G89.29 CHRONIC PAIN OF RIGHT KNEE: ICD-10-CM

## 2021-07-21 DIAGNOSIS — M25.562 CHRONIC PAIN OF LEFT KNEE: ICD-10-CM

## 2021-07-21 DIAGNOSIS — M17.12 PRIMARY OSTEOARTHRITIS OF LEFT KNEE: Primary | ICD-10-CM

## 2021-07-21 DIAGNOSIS — M25.561 CHRONIC PAIN OF RIGHT KNEE: ICD-10-CM

## 2021-07-21 DIAGNOSIS — M17.11 PRIMARY OSTEOARTHRITIS OF RIGHT KNEE: ICD-10-CM

## 2021-07-21 DIAGNOSIS — G89.29 CHRONIC PAIN OF LEFT KNEE: ICD-10-CM

## 2021-07-21 PROCEDURE — G8427 DOCREV CUR MEDS BY ELIG CLIN: HCPCS | Performed by: SPECIALIST

## 2021-07-21 PROCEDURE — G8432 DEP SCR NOT DOC, RNG: HCPCS | Performed by: SPECIALIST

## 2021-07-21 PROCEDURE — 99213 OFFICE O/P EST LOW 20 MIN: CPT | Performed by: SPECIALIST

## 2021-07-21 PROCEDURE — 20610 DRAIN/INJ JOINT/BURSA W/O US: CPT | Performed by: SPECIALIST

## 2021-07-21 PROCEDURE — 3017F COLORECTAL CA SCREEN DOC REV: CPT | Performed by: SPECIALIST

## 2021-07-21 PROCEDURE — G8417 CALC BMI ABV UP PARAM F/U: HCPCS | Performed by: SPECIALIST

## 2021-07-21 RX ORDER — BETAMETHASONE SODIUM PHOSPHATE AND BETAMETHASONE ACETATE 3; 3 MG/ML; MG/ML
6 INJECTION, SUSPENSION INTRA-ARTICULAR; INTRALESIONAL; INTRAMUSCULAR; SOFT TISSUE ONCE
Status: COMPLETED | OUTPATIENT
Start: 2021-07-21 | End: 2021-07-21

## 2021-07-21 RX ADMIN — BETAMETHASONE SODIUM PHOSPHATE AND BETAMETHASONE ACETATE 6 MG: 3; 3 INJECTION, SUSPENSION INTRA-ARTICULAR; INTRALESIONAL; INTRAMUSCULAR; SOFT TISSUE at 11:36

## 2021-07-21 NOTE — PROGRESS NOTES
Patient: Marcela Kramer                MRN: [id number]       SSN: [id number]  YOB: 1965        AGE: 63 y.o. SEX: female    PCP: Sheila Pitts MD  07/21/21    Chief Complaint   Patient presents with    Knee Pain     bilateral knee L>R     HISTORY:  Marcela Kramer is a 63 y.o. female who is seen for increased bilateral knee pain L>R. She has been experiencing bilateral anterior knee pain for the past several months. She does not recall any injury. She feels pain with standing, walking and stair climbing. She experiences startup pain after sitting and waking up in the morning. She has been treated by Dr. Jonel Ennis and RAZA Umanzor in the past.   She has responded to past cortisone injections. She even has difficulty doing her grocery shopping. She states that a previous Euflexxa series did not help. She has been treated by Dr. Jay Billings for her back pain. She has a h/o a stroke that affected her left side. Pain Assessment  7/21/2021   Location of Pain Knee   Location Modifiers Left;Right   Severity of Pain 10   Quality of Pain Aching   Quality of Pain Comment -   Duration of Pain Persistent   Frequency of Pain Constant   Aggravating Factors Walking   Aggravating Factors Comment -   Limiting Behavior Yes   Relieving Factors -   Relieving Factors Comment -   Result of Injury -     Occupation, etc:  Ms. Arlet Clarke receives social security disability benefits for chronic back pain. 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 She lives in [place] with her 26 yo daughter and 44 yo son. She has another daughter and 13 grandchildren. Her first great grandson was born in March  She has severe anxiety for which she takes medication. She is not diabetic. She is hypertensive. She has COPD. She does not have a vehicle and needs people to drive her to her appointments. Ms. Arlet Clarke weighs 163 lbs and is 4'11\" tall.   She is needle phobic. She has been vaccinated for Covid-19.      No results found for: HBA1C, JYE8YICJ, WAX6OVBK, TGJ3RRIJ  Weight Metrics 7/21/2021 11/11/2020 10/5/2020 9/16/2020 3/4/2020 1/15/2020 1/8/2020   Weight 172 lb 163 lb 158 lb 155 lb 172 lb 3.2 oz 171 lb 12.8 oz 172 lb   BMI 34.74 kg/m2 32.92 kg/m2 31.91 kg/m2 31.31 kg/m2 34.78 kg/m2 34.7 kg/m2 34.74 kg/m2       Patient Active Problem List   Diagnosis Code    Lumbar herniated disc M51.26    Lumbar neuritis M54.16    Other intervertebral disc degeneration, lumbar region M51.36    Severe obesity (HCC) E66.01     REVIEW OF SYSTEMS:    Constitutional Symptoms: Negative   Eyes: Negative   Ears, Nose, Throat and Mouth: Negative   Cardiovascular: Negative   Respiratory: Negative   Genitourinary: Per HPI   Gastrointestinal: Per HPI   Integumentary (Skin and/or Breast): Negative   Musculoskeletal: Per HPI   Endocrine/Rheumatologic: Negative   Neurological: Per HPI   Hematology/Lymphatic: Negative    Allergic/Immunologic: Negative   Phychiatric: Negative    Social History     Socioeconomic History    Marital status: LEGALLY      Spouse name: Not on file    Number of children: Not on file    Years of education: Not on file    Highest education level: Not on file   Occupational History    Not on file   Tobacco Use    Smoking status: Current Every Day Smoker     Packs/day: 1.00    Smokeless tobacco: Never Used   Substance and Sexual Activity    Alcohol use: No    Drug use: Not on file    Sexual activity: Not on file   Other Topics Concern     Service Not Asked    Blood Transfusions Not Asked    Caffeine Concern Not Asked    Occupational Exposure Not Asked    Hobby Hazards Not Asked    Sleep Concern Not Asked    Stress Concern Not Asked    Weight Concern Not Asked    Special Diet Not Asked    Back Care Not Asked    Exercise Not Asked    Bike Helmet Not Asked    Seat Belt Not Asked    Self-Exams Not Asked   Social History Narrative  Not on file     Social Determinants of Health     Financial Resource Strain:     Difficulty of Paying Living Expenses:    Food Insecurity:     Worried About Running Out of Food in the Last Year:     [address] in the Last Year:    Transportation Needs:     Lack of Transportation (Medical):  Lack of Transportation (Non-Medical):    Physical Activity:     Days of Exercise per Week:     Minutes of Exercise per Session:    Stress:     Feeling of Stress :    Social Connections:     Frequency of Communication with Friends and Family:     Frequency of Social Gatherings with Friends and Family:     Attends Orthodox Services:     Active Member of Clubs or Organizations:     Attends Club or Organization Meetings:     Marital Status:    Intimate Partner Violence:     Fear of Current or Ex-Partner:     Emotionally Abused:     Physically Abused:     Sexually Abused:       No Known Allergies   Current Outpatient Medications   Medication Sig    venlafaxine-SR (EFFEXOR-XR) 37.5 mg capsule take 1 capsule by mouth once daily    diclofenac EC (VOLTAREN) 75 mg EC tablet take 1 tablet by mouth twice a day    omeprazole (PRILOSEC) 20 mg capsule take 1 capsule by mouth AT LEAST 30 MINUTES PRIOR TO EATING DINNER    SYMBICORT 160-4.5 mcg/actuation HFAA     ARIPiprazole (ABILIFY) 5 mg tablet     aspirin delayed-release 81 mg tablet Take 81 mg by mouth.  atorvastatin (LIPITOR) 40 mg tablet Take 40 mg by mouth.  traZODone (DESYREL) 150 mg tablet take 1 tablet by mouth at bedtime    diclofenac (VOLTAREN) 1 % gel Apply 2 g to affected area four (4) times daily.  lamoTRIgine (LAMICTAL) 100 mg tablet Take  by mouth daily.     diazePAM (VALIUM) 10 mg tablet Take 1 tab by mouth as directed by nurse prior to procedure    lamoTRIgine (LAMICTAL) 25 mg tablet take 1 tablet by mouth at bedtime for 2 weeks then INCREASE TO 50 MG AT BEDTIME    amLODIPine (NORVASC) 2.5 mg tablet     montelukast (SINGULAIR) 10 mg tablet     PROVENTIL HFA 90 mcg/actuation inhaler INHALE 2 PUFFS PO QID PRN    cyclobenzaprine (FLEXERIL) 10 mg tablet TK 1 T PO Q 8 H PRF BACK PAIN    hydroCHLOROthiazide (HYDRODIURIL) 25 mg tablet TAKE 1 T PO QD    topiramate (TOPAMAX) 25 mg tablet 3 tabs PO QHS    olmesartan (BENICAR) 5 mg tablet  (Patient not taking: Reported on 7/21/2021)    lurasidone (LATUDA) 80 mg tab tablet Take  by mouth. (Patient not taking: Reported on 7/21/2021)    OXcarbazepine (TRILEPTAL) 300 mg tablet take 1 tablet by mouth every morning and 2 tablets at bedtime (Patient not taking: Reported on 7/21/2021)    BELSOMRA 15 mg tablet  (Patient not taking: Reported on 7/21/2021)    LATUDA 20 mg tab tablet take 1 tablet by mouth WITH DINNER for 1 week then INCREASE TO 40 MG WITH DINNER (Patient not taking: Reported on 7/21/2021)    mirtazapine (REMERON) 15 mg tablet  (Patient not taking: Reported on 7/21/2021)    QUEtiapine (SEROQUEL) 50 mg tablet take 1/2 to 1 tablet by mouth at bedtime (Patient not taking: Reported on 7/21/2021)    pregabalin (LYRICA) 75 mg capsule Take 1 Cap by mouth two (2) times a day. Max Daily Amount: 150 mg. (Patient not taking: Reported on 5/22/2018)    pregabalin (LYRICA) 50 mg capsule Take 1 Cap by mouth two (2) times a day. Max Daily Amount: 100 mg.  (Patient not taking: Reported on 5/22/2018)    allopurinol (ZYLOPRIM) 300 mg tablet TK 1 T PO QD (Patient not taking: Reported on 7/21/2021)    ALPRAZolam (XANAX) 0.5 mg tablet TAKE 1 TO 2 T PO TID PRN FOR 30 DAYS (Patient not taking: Reported on 7/21/2021)    citalopram (CELEXA) 40 mg tablet TK 1 T PO QD (Patient not taking: Reported on 7/21/2021)    clonazePAM (KLONOPIN) 1 mg tablet TK 1 T PO Q 8 H PRN (Patient not taking: Reported on 7/21/2021)    diclofenac EC (VOLTAREN) 75 mg EC tablet TK 1 T PO BID (Patient not taking: Reported on 7/21/2021)    gabapentin (NEURONTIN) 300 mg capsule TK 7 CS PO DAILY FOR 30 DAYS (Patient not taking: Reported on 7/21/2021)    hydrOXYzine pamoate (VISTARIL) 25 mg capsule TK 1 C PO Q 12 H PRF ANXIETY FOR 30 DAYS (Patient not taking: Reported on 7/21/2021)    simvastatin (ZOCOR) 20 mg tablet TK 1 T PO QHS (Patient not taking: Reported on 7/21/2021)    sucralfate (CARAFATE) 1 gram tablet TAKE 1 T PO QID BEFORE MEALS AND AT BEDTIME (Patient not taking: Reported on 7/21/2021)     No current facility-administered medications for this visit. PHYSICAL EXAMINATION:  Visit Vitals  Pulse 74   Resp 15   Ht 4' 11\" (1.499 m)   Wt 172 lb (78 kg)   SpO2 98%   BMI 34.74 kg/m²       ORTHO EXAMINATION:  Examination Right knee Left knee   Skin Intact Intact   Range of motion 120-0 120-0   Effusion - -   Medial joint line tenderness - -   Lateral joint line tenderness + +   Popliteal tenderness - -   Osteophytes palpable - -   Randys - -   Patella crepitus - -   Anterior drawer - -   Lateral laxity - -   Medial laxity - -   Varus deformity + +   Valgus deformity - -   Pretibial edema - -   Calf tenderness - -        Anterior pain     TIME OUT:  Chart reviewed for the following:   I, Angel Pitt MD, have reviewed the History, Physical and updated the Allergic reactions for Chloé Arellano   TIME OUT performed immediately prior to start of procedure:  Fiona Mcgarry MD, have performed the following reviews on Chloé Arellano prior to the start of the procedure:          * Patient was identified by name and date of birth   * Agreement on procedure being performed was verified  * Risks and Benefits explained to the patient  * Procedure site verified and marked as necessary  * Patient was positioned for comfort  * Consent was obtained     Time: 11:29 AM     Date of procedure: 7/21/2021  Procedure performed by:  Angel Pitt MD  Ms. Morataya tolerated the procedure well with no complications.      RADIOGRAPHS:  XR BILAT KNEE 3/4/20 GILMER  -I have independently reviewed these images during this office visit. - Miranda   IMPRESSION:  Three views - No fractures, no effusion, severe medial joint space narrowing, + osteophytes present. Kellgren Elmer grade 4. IMPRESSION:      ICD-10-CM ICD-9-CM    1. Primary osteoarthritis of left knee  M17.12 715.16 betamethasone (CELESTONE) injection 6 mg      NV DRAIN/INJECT LARGE JOINT/BURSA   2. Chronic pain of left knee  M25.562 719.46 betamethasone (CELESTONE) injection 6 mg    G89.29 338.29 NV DRAIN/INJECT LARGE JOINT/BURSA   3. Primary osteoarthritis of right knee  M17.11 715.16 betamethasone (CELESTONE) injection 6 mg      NV DRAIN/INJECT LARGE JOINT/BURSA   4. Chronic pain of right knee  M25.561 719.46 betamethasone (CELESTONE) injection 6 mg    G89.29 338.29 NV DRAIN/INJECT LARGE JOINT/BURSA     PLAN:  We discussed possible need for a knee arthroplasty at some time in the future if pain continues, but her COPD and recent stroke are contraindications. After discussing treatment options, patient's knees were injected with 4 cc Marcaine and 1/2 cc Celestone. She will follow up as needed.      Scribed by Chelly Baxter ([address]) as dictated by Susi Stewart MD

## 2021-10-06 ENCOUNTER — OFFICE VISIT (OUTPATIENT)
Dept: ORTHOPEDIC SURGERY | Age: 56
End: 2021-10-06
Payer: MEDICARE

## 2021-10-06 VITALS
TEMPERATURE: 97.5 F | OXYGEN SATURATION: 99 % | BODY MASS INDEX: 35.88 KG/M2 | HEART RATE: 70 BPM | WEIGHT: 178 LBS | HEIGHT: 59 IN

## 2021-10-06 DIAGNOSIS — M54.16 LUMBAR RADICULOPATHY: Primary | ICD-10-CM

## 2021-10-06 DIAGNOSIS — G89.29 CHRONIC PAIN OF RIGHT KNEE: ICD-10-CM

## 2021-10-06 DIAGNOSIS — M25.562 CHRONIC PAIN OF LEFT KNEE: ICD-10-CM

## 2021-10-06 DIAGNOSIS — M54.50 LUMBAR PAIN: ICD-10-CM

## 2021-10-06 DIAGNOSIS — M17.12 PRIMARY OSTEOARTHRITIS OF LEFT KNEE: ICD-10-CM

## 2021-10-06 DIAGNOSIS — M25.561 CHRONIC PAIN OF RIGHT KNEE: ICD-10-CM

## 2021-10-06 DIAGNOSIS — M17.11 PRIMARY OSTEOARTHRITIS OF RIGHT KNEE: ICD-10-CM

## 2021-10-06 DIAGNOSIS — G89.29 CHRONIC PAIN OF LEFT KNEE: ICD-10-CM

## 2021-10-06 PROCEDURE — 3017F COLORECTAL CA SCREEN DOC REV: CPT | Performed by: SPECIALIST

## 2021-10-06 PROCEDURE — G8510 SCR DEP NEG, NO PLAN REQD: HCPCS | Performed by: SPECIALIST

## 2021-10-06 PROCEDURE — 99213 OFFICE O/P EST LOW 20 MIN: CPT | Performed by: SPECIALIST

## 2021-10-06 PROCEDURE — G8417 CALC BMI ABV UP PARAM F/U: HCPCS | Performed by: SPECIALIST

## 2021-10-06 PROCEDURE — 20610 DRAIN/INJ JOINT/BURSA W/O US: CPT | Performed by: SPECIALIST

## 2021-10-06 PROCEDURE — G8427 DOCREV CUR MEDS BY ELIG CLIN: HCPCS | Performed by: SPECIALIST

## 2021-10-06 PROCEDURE — 20552 NJX 1/MLT TRIGGER POINT 1/2: CPT | Performed by: SPECIALIST

## 2021-10-06 RX ORDER — BETAMETHASONE SODIUM PHOSPHATE AND BETAMETHASONE ACETATE 3; 3 MG/ML; MG/ML
6 INJECTION, SUSPENSION INTRA-ARTICULAR; INTRALESIONAL; INTRAMUSCULAR; SOFT TISSUE ONCE
Status: COMPLETED | OUTPATIENT
Start: 2021-10-06 | End: 2021-10-06

## 2021-10-06 RX ORDER — BETAMETHASONE SODIUM PHOSPHATE AND BETAMETHASONE ACETATE 3; 3 MG/ML; MG/ML
3 INJECTION, SUSPENSION INTRA-ARTICULAR; INTRALESIONAL; INTRAMUSCULAR; SOFT TISSUE ONCE
Status: COMPLETED | OUTPATIENT
Start: 2021-10-06 | End: 2021-10-06

## 2021-10-06 RX ADMIN — BETAMETHASONE SODIUM PHOSPHATE AND BETAMETHASONE ACETATE 3 MG: 3; 3 INJECTION, SUSPENSION INTRA-ARTICULAR; INTRALESIONAL; INTRAMUSCULAR; SOFT TISSUE at 10:04

## 2021-10-06 RX ADMIN — BETAMETHASONE SODIUM PHOSPHATE AND BETAMETHASONE ACETATE 6 MG: 3; 3 INJECTION, SUSPENSION INTRA-ARTICULAR; INTRALESIONAL; INTRAMUSCULAR; SOFT TISSUE at 10:04

## 2021-10-06 NOTE — PROGRESS NOTES
Patient: Ricardo Kimball                MRN: [id number]       SSN: [id number]  YOB: 1965        AGE: 63 y.o. SEX: female    PCP: Huber Paz MD  10/06/21    Chief Complaint   Patient presents with    Knee Pain     both knees,pain in both legs     HISTORY:  Ricardo Kimball is a 63 y.o. female who is seen for increased bilateral knee pain L>R. She has been experiencing bilateral anterior knee pain for the past several months. She does not recall any injury. She feels pain with standing, walking and stair climbing. She experiences startup pain after sitting and waking up in the morning. She even has difficulty doing her grocery shopping. She has been treated by Dr. Rodolfo Ng and RAZA Dewitt in the past.   She has responded to past cortisone injections. She is also seen for back pain. She has been treated by Dr. Mike Interiano for her back pain. She says her knee pain exacerbates her back pain. She states that Dr. Rossy Francis said that there's nothing more he can do for her. She has a h/o a stroke a few years ago that affected her left side. She is doing better but still feels weak. Pain Assessment  10/6/2021   Location of Pain Knee;Leg   Location Modifiers Right;Left   Severity of Pain 6   Quality of Pain Throbbing;Aching; Other (Comment)   Quality of Pain Comment stiffness   Duration of Pain -   Frequency of Pain Constant   Aggravating Factors Bending;Stretching;Straightening;Walking   Aggravating Factors Comment -   Limiting Behavior Some   Relieving Factors NSAID   Relieving Factors Comment -   Result of Injury -     Occupation, etc:  Ms. Diego José receives social security disability benefits for chronic back pain. 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 She started to receive disability 5 years ago. She lives with her 28 yo daughter and 44 yo son in [place]. She has another daughter and 13 grandchildren. Her first great grandson was born in March.   She has severe anxiety for which she takes medication. She is not diabetic. She is hypertensive. She has COPD. She does not have a vehicle and needs people to drive her to her appointments. Ms. Erich Jacobo weighs 178 lbs and is 4'11\" tall. She is needle phobic. She has been vaccinated for Covid-19.      No results found for: HBA1C, FEL1JLQE, XEK2AHUG, GWV0JGGP  Weight Metrics 10/6/2021 7/21/2021 11/11/2020 10/5/2020 9/16/2020 3/4/2020 1/15/2020   Weight 178 lb 172 lb 163 lb 158 lb 155 lb 172 lb 3.2 oz 171 lb 12.8 oz   BMI 35.95 kg/m2 34.74 kg/m2 32.92 kg/m2 31.91 kg/m2 31.31 kg/m2 34.78 kg/m2 34.7 kg/m2       Patient Active Problem List   Diagnosis Code    Lumbar herniated disc M51.26    Lumbar neuritis M54.16    Other intervertebral disc degeneration, lumbar region M51.36    Severe obesity (HCC) E66.01     REVIEW OF SYSTEMS:    Constitutional Symptoms: Negative   Eyes: Negative   Ears, Nose, Throat and Mouth: Negative   Cardiovascular: Negative   Respiratory: Negative   Genitourinary: Per HPI   Gastrointestinal: Per HPI   Integumentary (Skin and/or Breast): Negative   Musculoskeletal: Per HPI   Endocrine/Rheumatologic: Negative   Neurological: Per HPI   Hematology/Lymphatic: Negative    Allergic/Immunologic: Negative   Phychiatric: Negative    Social History     Socioeconomic History    Marital status: LEGALLY      Spouse name: Not on file    Number of children: Not on file    Years of education: Not on file    Highest education level: Not on file   Occupational History    Not on file   Tobacco Use    Smoking status: Current Every Day Smoker     Packs/day: 1.00    Smokeless tobacco: Never Used   Substance and Sexual Activity    Alcohol use: No    Drug use: Not on file    Sexual activity: Not on file   Other Topics Concern     Service Not Asked    Blood Transfusions Not Asked    Caffeine Concern Not Asked    Occupational Exposure Not Asked    Hobby Hazards Not Asked    Sleep Concern Not Asked    Stress Concern Not Asked    Weight Concern Not Asked    Special Diet Not Asked    Back Care Not Asked    Exercise Not Asked    Bike Helmet Not Asked   [address] Not Asked    Self-Exams Not Asked   Social History Narrative    Not on file     Social Determinants of Health     Financial Resource Strain:     Difficulty of Paying Living Expenses:    Food Insecurity:     Worried About Running Out of Food in the Last Year:     Ran Out of Food in the Last Year:    Transportation Needs:     Lack of Transportation (Medical):  Lack of Transportation (Non-Medical):    Physical Activity:     Days of Exercise per Week:     Minutes of Exercise per Session:    Stress:     Feeling of Stress :    Social Connections:     Frequency of Communication with Friends and Family:     Frequency of Social Gatherings with Friends and Family:     Attends Ten[employer] Services:     Active Member of Clubs or Organizations:     Attends Club or Organization Meetings:     Marital Status:    Intimate Partner Violence:     Fear of Current or Ex-Partner:     Emotionally Abused:     Physically Abused:     Sexually Abused:       No Known Allergies   Current Outpatient Medications   Medication Sig    venlafaxine-SR (EFFEXOR-XR) 37.5 mg capsule take 1 capsule by mouth once daily    diclofenac EC (VOLTAREN) 75 mg EC tablet take 1 tablet by mouth twice a day    omeprazole (PRILOSEC) 20 mg capsule take 1 capsule by mouth AT LEAST 30 MINUTES PRIOR TO EATING DINNER    SYMBICORT 160-4.5 mcg/actuation HFAA     ARIPiprazole (ABILIFY) 5 mg tablet     aspirin delayed-release 81 mg tablet Take 81 mg by mouth.  atorvastatin (LIPITOR) 40 mg tablet Take 40 mg by mouth.  olmesartan (BENICAR) 5 mg tablet     traZODone (DESYREL) 150 mg tablet take 1 tablet by mouth at bedtime    diclofenac (VOLTAREN) 1 % gel Apply 2 g to affected area four (4) times daily.  lamoTRIgine (LAMICTAL) 100 mg tablet Take  by mouth daily.     lurasidone (LATUDA) 80 mg tab tablet Take  by mouth.  diazePAM (VALIUM) 10 mg tablet Take 1 tab by mouth as directed by nurse prior to procedure    OXcarbazepine (TRILEPTAL) 300 mg tablet take 1 tablet by mouth every morning and 2 tablets at bedtime    lamoTRIgine (LAMICTAL) 25 mg tablet take 1 tablet by mouth at bedtime for 2 weeks then INCREASE TO 50 MG AT BEDTIME    amLODIPine (NORVASC) 2.5 mg tablet     LATUDA 20 mg tab tablet take 1 tablet by mouth WITH DINNER for 1 week then INCREASE TO 40 MG WITH DINNER    mirtazapine (REMERON) 15 mg tablet     montelukast (SINGULAIR) 10 mg tablet     QUEtiapine (SEROQUEL) 50 mg tablet take 1/2 to 1 tablet by mouth at bedtime    pregabalin (LYRICA) 75 mg capsule Take 1 Cap by mouth two (2) times a day. Max Daily Amount: 150 mg.  pregabalin (LYRICA) 50 mg capsule Take 1 Cap by mouth two (2) times a day.  Max Daily Amount: 100 mg.    PROVENTIL HFA 90 mcg/actuation inhaler INHALE 2 PUFFS PO QID PRN    cyclobenzaprine (FLEXERIL) 10 mg tablet TK 1 T PO Q 8 H PRF BACK PAIN    gabapentin (NEURONTIN) 300 mg capsule TK 7 CS PO DAILY FOR 30 DAYS    hydroCHLOROthiazide (HYDRODIURIL) 25 mg tablet TAKE 1 T PO QD    hydrOXYzine pamoate (VISTARIL) 25 mg capsule TK 1 C PO Q 12 H PRF ANXIETY FOR 30 DAYS    simvastatin (ZOCOR) 20 mg tablet TK 1 T PO QHS    sucralfate (CARAFATE) 1 gram tablet TAKE 1 T PO QID BEFORE MEALS AND AT BEDTIME    topiramate (TOPAMAX) 25 mg tablet 3 tabs PO QHS    BELSOMRA 15 mg tablet  (Patient not taking: Reported on 7/21/2021)    allopurinol (ZYLOPRIM) 300 mg tablet TK 1 T PO QD (Patient not taking: Reported on 7/21/2021)    ALPRAZolam (XANAX) 0.5 mg tablet TAKE 1 TO 2 T PO TID PRN FOR 30 DAYS (Patient not taking: Reported on 7/21/2021)    citalopram (CELEXA) 40 mg tablet TK 1 T PO QD (Patient not taking: Reported on 7/21/2021)    clonazePAM (KLONOPIN) 1 mg tablet TK 1 T PO Q 8 H PRN (Patient not taking: Reported on 7/21/2021)    diclofenac EC (VOLTAREN) 75 mg EC tablet TK 1 T PO BID (Patient not taking: Reported on 7/21/2021)     No current facility-administered medications for this visit. PHYSICAL EXAMINATION:  Visit Vitals  Pulse 70   Temp 97.5 °F (36.4 °C) (Temporal)   Ht 4' 11\" (1.499 m)   Wt 178 lb (80.7 kg)   SpO2 99%   BMI 35.95 kg/m²       ORTHO EXAMINATION:  Examination Right knee Left knee   Skin Intact Intact   Range of motion 120-0 120-0   Effusion - -   Medial joint line tenderness - -   Lateral joint line tenderness + +   Popliteal tenderness - -   Osteophytes palpable - -   Randys - -   Patella crepitus - -   Anterior drawer - -   Lateral laxity - -   Medial laxity - -   Varus deformity + +   Valgus deformity - -   Pretibial edema + +   Calf tenderness - -     Examination Lumbar Thoracic   Skin Intact Intact   Tenderness + lower paralumbar -   Tightness + lower paralumbar -   Lordosis Normal N/A   Kyphosis N/A Normal   Scoliosis - -   Flexion Fingertips to mid shin N/A   Extension 10 N/A   Knee reflexes Normal N/A   Ankle reflexes Normal N/A   Straight leg raise - N/A   Calf tenderness - N/A        TIME OUT:  Chart reviewed for the following:   I, Afshin Quintanilla MD, have reviewed the History, Physical and updated the Allergic reactions for Tucker Veliz   TIME OUT performed immediately prior to start of procedure:  Tammie Chand MD, have performed the following reviews on Tucker Veliz prior to the start of the procedure:          * Patient was identified by name and date of birth   * Agreement on procedure being performed was verified  * Risks and Benefits explained to the patient  * Procedure site verified and marked as necessary  * Patient was positioned for comfort  * Consent was obtained     Time: 9:56 AM      Date of procedure: 10/6/2021  Procedure performed by:  Afshin Quintanilla MD  Ms. Morataya tolerated the procedure well with no complications.      RADIOGRAPHS:  XR BILAT KNEE 3/4/20 GILMER GARCIA have independently reviewed these images during this office visit. -Dr. Francis Whitley:  Three views - No fractures, no effusion, severe medial joint space narrowing, + osteophytes present. Kellgren Elmer grade 4. IMPRESSION:      ICD-10-CM ICD-9-CM    1. Lumbar radiculopathy  M54.16 724.4 betamethasone (CELESTONE) injection 3 mg      INJECT TRIGGER POINT, 1 OR 2   2. Lumbar pain  M54.50 724.2 betamethasone (CELESTONE) injection 3 mg      INJECT TRIGGER POINT, 1 OR 2   3. Primary osteoarthritis of left knee  M17.12 715.16 betamethasone (CELESTONE) injection 6 mg      HI DRAIN/INJECT LARGE JOINT/BURSA      PROCEDURE AUTHORIZATION TO    4. Chronic pain of left knee  M25.562 719.46 betamethasone (CELESTONE) injection 6 mg    G89.29 338.29 HI DRAIN/INJECT LARGE JOINT/BURSA      PROCEDURE AUTHORIZATION TO    5. Primary osteoarthritis of right knee  M17.11 715.16 betamethasone (CELESTONE) injection 6 mg      HI DRAIN/INJECT LARGE JOINT/BURSA      PROCEDURE AUTHORIZATION TO    6. Chronic pain of right knee  M25.561 719.46 betamethasone (CELESTONE) injection 6 mg    G89.29 338.29 HI DRAIN/INJECT LARGE JOINT/BURSA      PROCEDURE AUTHORIZATION TO      PLAN:  Consider visco supplementation if pain continues. We discussed possible need for a knee arthroplasty at some time in the future if pain continues, but her COPD and recent stroke are contraindications. After discussing treatment options, patient's knees and lower paralumbar region were injected with 4 cc Marcaine and 1/2 cc Celestone. She will follow up as needed.      Scribed by Hunter Marin ([facility]) as dictated by Mireya Obregon MD

## 2021-10-12 ENCOUNTER — DOCUMENTATION ONLY (OUTPATIENT)
Dept: ORTHOPEDIC SURGERY | Age: 56
End: 2021-10-12

## 2021-11-10 ENCOUNTER — OFFICE VISIT (OUTPATIENT)
Dept: ORTHOPEDIC SURGERY | Age: 56
End: 2021-11-10
Payer: MEDICARE

## 2021-11-10 VITALS
OXYGEN SATURATION: 98 % | HEIGHT: 59 IN | WEIGHT: 174.6 LBS | HEART RATE: 70 BPM | BODY MASS INDEX: 35.2 KG/M2 | TEMPERATURE: 97.5 F

## 2021-11-10 DIAGNOSIS — M17.11 PRIMARY OSTEOARTHRITIS OF RIGHT KNEE: ICD-10-CM

## 2021-11-10 DIAGNOSIS — G89.29 CHRONIC PAIN OF RIGHT KNEE: ICD-10-CM

## 2021-11-10 DIAGNOSIS — M25.562 CHRONIC PAIN OF LEFT KNEE: ICD-10-CM

## 2021-11-10 DIAGNOSIS — G89.29 CHRONIC PAIN OF LEFT KNEE: ICD-10-CM

## 2021-11-10 DIAGNOSIS — M54.50 LUMBAR PAIN: ICD-10-CM

## 2021-11-10 DIAGNOSIS — M17.12 PRIMARY OSTEOARTHRITIS OF LEFT KNEE: Primary | ICD-10-CM

## 2021-11-10 DIAGNOSIS — M54.16 LUMBAR RADICULOPATHY: ICD-10-CM

## 2021-11-10 DIAGNOSIS — M25.561 CHRONIC PAIN OF RIGHT KNEE: ICD-10-CM

## 2021-11-10 PROCEDURE — 99213 OFFICE O/P EST LOW 20 MIN: CPT | Performed by: SPECIALIST

## 2021-11-10 PROCEDURE — 20610 DRAIN/INJ JOINT/BURSA W/O US: CPT | Performed by: SPECIALIST

## 2021-11-10 PROCEDURE — G8510 SCR DEP NEG, NO PLAN REQD: HCPCS | Performed by: SPECIALIST

## 2021-11-10 PROCEDURE — G8417 CALC BMI ABV UP PARAM F/U: HCPCS | Performed by: SPECIALIST

## 2021-11-10 PROCEDURE — 3017F COLORECTAL CA SCREEN DOC REV: CPT | Performed by: SPECIALIST

## 2021-11-10 PROCEDURE — G8427 DOCREV CUR MEDS BY ELIG CLIN: HCPCS | Performed by: SPECIALIST

## 2021-11-10 PROCEDURE — 20552 NJX 1/MLT TRIGGER POINT 1/2: CPT | Performed by: SPECIALIST

## 2021-11-10 RX ORDER — LOSARTAN POTASSIUM 50 MG/1
50 TABLET ORAL DAILY
COMMUNITY
Start: 2021-09-27

## 2021-11-10 RX ORDER — HYDROCHLOROTHIAZIDE 12.5 MG/1
12.5 TABLET ORAL DAILY
COMMUNITY
Start: 2021-09-27

## 2021-11-10 RX ORDER — BETAMETHASONE SODIUM PHOSPHATE AND BETAMETHASONE ACETATE 3; 3 MG/ML; MG/ML
3 INJECTION, SUSPENSION INTRA-ARTICULAR; INTRALESIONAL; INTRAMUSCULAR; SOFT TISSUE ONCE
Status: COMPLETED | OUTPATIENT
Start: 2021-11-10 | End: 2021-11-10

## 2021-11-10 RX ORDER — OMEPRAZOLE 40 MG/1
CAPSULE, DELAYED RELEASE ORAL
COMMUNITY
Start: 2021-10-28

## 2021-11-10 RX ORDER — TRAZODONE HYDROCHLORIDE 100 MG/1
100 TABLET ORAL
COMMUNITY

## 2021-11-10 RX ADMIN — BETAMETHASONE SODIUM PHOSPHATE AND BETAMETHASONE ACETATE 3 MG: 3; 3 INJECTION, SUSPENSION INTRA-ARTICULAR; INTRALESIONAL; INTRAMUSCULAR; SOFT TISSUE at 12:31

## 2021-11-10 NOTE — PROGRESS NOTES
Patient: Edith Peabody                MRN: [id number]       SSN: [id number]  YOB: 1965        AGE: 61 y.o. SEX: female    PCP: Perry Shah MD  11/10/21    CC: BILATERAL KNEE AND BACK PAIN    HISTORY:  Edith Peabody is a 61 y.o. female who is seen for increased bilateral knee pain L>R. She has been experiencing bilateral anterior knee pain for the past several months. She does not recall any injury. She feels pain with standing, walking and stair climbing. She experiences startup pain after sitting and waking up in the morning. She even has difficulty doing her grocery shopping. She has been treated by Dr. Santana Boone and RAZA Greer in the past.   She has responded to past cortisone injections. She is also seen for back pain. She has been treated by Dr. Brittney Alba for her back pain. She says her knee pain exacerbates her back pain. She feels back pain that radiates to her buttocks and down her legs. She states that Dr. Renetta Clement said that there's nothing more he can do for her. She says she has been experiencing terrible leg and foot cramps lately. She is seeing her PCP soon. She has a h/o a stroke a few years ago that affected her left side. She is doing better but still feels weak. Pain Assessment  11/10/2021   Location of Pain Knee   Location Modifiers Left;Right   Severity of Pain 9   Quality of Pain Dull;Aching; Throbbing;Burning;Cracking; Popping   Quality of Pain Comment -   Duration of Pain Persistent   Frequency of Pain Constant   Aggravating Factors Bending;Stretching;Exercise;Walking;Stairs; Other (Comment)   Aggravating Factors Comment sitting makes them stiff   Limiting Behavior Yes   Relieving Factors Nothing   Relieving Factors Comment -   Result of Injury No     Occupation, etc:  Ms. Allyson Rm receives social security disability benefits for chronic back pain. 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  She started to receive disability 5 years ago. She lives with her 26 yo daughter and 46 yo son in [place]. She has another daughter and 13 grandchildren. Her first great grandson was born in March. She has severe anxiety for which she takes medication. She is not diabetic. She is hypertensive. She has COPD. She does not have a vehicle and needs people to drive her to her appointments. Ms. Erich Jacobo weighs 174 lbs and is 4'11\" tall. She is needle phobic. She has been vaccinated for Covid-19.      No results found for: HBA1C, UPM8BODJ, OHO5VJJF, JUH1TEGM  Weight Metrics 11/10/2021 10/6/2021 7/21/2021 11/11/2020 10/5/2020 9/16/2020 3/4/2020   Weight 174 lb 9.6 oz 178 lb 172 lb 163 lb 158 lb 155 lb 172 lb 3.2 oz   BMI 35.26 kg/m2 35.95 kg/m2 34.74 kg/m2 32.92 kg/m2 31.91 kg/m2 31.31 kg/m2 34.78 kg/m2       Patient Active Problem List   Diagnosis Code    Lumbar herniated disc M51.26    Lumbar neuritis M54.16    Other intervertebral disc degeneration, lumbar region M51.36    Severe obesity ([facility] [address] E66.01     REVIEW OF SYSTEMS:    Constitutional Symptoms: Negative   Eyes: Negative   Ears, Nose, Throat and Mouth: Negative   Cardiovascular: Negative   Respiratory: Negative   Genitourinary: Per HPI   Gastrointestinal: Per HPI   Integumentary (Skin and/or Breast): Negative   Musculoskeletal: Per HPI   Endocrine/Rheumatologic: Negative   Neurological: Per HPI   Hematology/Lymphatic: Negative    Allergic/Immunologic: Negative   Phychiatric: Negative    Social History     Socioeconomic History    Marital status: LEGALLY      Spouse name: Not on file    Number of children: Not on file    Years of education: Not on file    Highest education level: Not on file   Occupational History    Not on file   Tobacco Use    Smoking status: Current Every Day Smoker     Packs/day: 1.00    Smokeless tobacco: Never Used   Substance and Sexual Activity    Alcohol use: No    Drug use: Not on file    Sexual activity: Not on file   Other Topics Concern [address] Not Asked    Blood Transfusions Not Asked    Caffeine Concern Not Asked    Occupational Exposure Not Asked    Hobby Hazards Not Asked    Sleep Concern Not Asked    Stress Concern Not Asked    Weight Concern Not Asked    Special Diet Not Asked    Back Care Not Asked    Exercise Not Asked    Bike Helmet Not Asked   [address] Not Asked    Self-Exams Not Asked   Social History Narrative    Not on file     Social Determinants of Health     Financial Resource Strain:     Difficulty of Paying Living Expenses: Not on file   Food Insecurity:     Worried About Running Out of Food in the Last Year: Not on file    Danilo of Food in the Last Year: Not on file   Transportation Needs:     Lack of Transportation (Medical): Not on file    Lack of Transportation (Non-Medical): Not on file   Physical Activity:     Days of Exercise per Week: Not on file    Minutes of Exercise per Session: Not on file   Stress:     Feeling of Stress : Not on file   Social Connections:     Frequency of Communication with Friends and Family: Not on file    Frequency of Social Gatherings with Friends and Family: Not on file    Attends Mu-ism Services: Not on file    Active Member of [address] [place] [address] or Organizations: Not on file    Attends Club or Organization Meetings: Not on file    Marital Status: Not on file   Intimate Partner Violence:     Fear of Current or Ex-Partner: Not on file    Emotionally Abused: Not on file    Physically Abused: Not on file    Sexually Abused: Not on file   Housing Stability:     Unable to Pay for Housing in the Last Year: Not on file    Number of [place] in the Last Year: Not on file    Unstable Housing in the Last Year: Not on file      No Known Allergies   Current Outpatient Medications   Medication Sig    losartan (COZAAR) 50 mg tablet Take 50 mg by mouth daily.  hydroCHLOROthiazide (HYDRODIURIL) 12.5 mg tablet Take 12.5 mg by mouth daily.     omeprazole (PRILOSEC) 40 mg capsule take 1 capsule by mouth once daily 30 MINUTE before breakfast    traZODone (DESYREL) 100 mg tablet Take 100 mg by mouth nightly. Indications: Patient is taking one tab twice a day    SYMBICORT 160-4.5 mcg/actuation HFAA     aspirin delayed-release 81 mg tablet Take 81 mg by mouth.  atorvastatin (LIPITOR) 40 mg tablet Take 40 mg by mouth.  venlafaxine-SR (EFFEXOR-XR) 37.5 mg capsule take 1 capsule by mouth once daily (Patient not taking: Reported on 11/10/2021)    diclofenac EC (VOLTAREN) 75 mg EC tablet take 1 tablet by mouth twice a day (Patient not taking: Reported on 11/10/2021)    omeprazole (PRILOSEC) 20 mg capsule take 1 capsule by mouth AT LEAST 30 MINUTES PRIOR TO EATING DINNER (Patient not taking: Reported on 11/10/2021)    ARIPiprazole (ABILIFY) 5 mg tablet  (Patient not taking: Reported on 11/10/2021)    olmesartan (BENICAR) 5 mg tablet  (Patient not taking: Reported on 11/10/2021)    traZODone (DESYREL) 150 mg tablet take 1 tablet by mouth at bedtime (Patient not taking: Reported on 11/10/2021)    diclofenac (VOLTAREN) 1 % gel Apply 2 g to affected area four (4) times daily. (Patient not taking: Reported on 11/10/2021)    lamoTRIgine (LAMICTAL) 100 mg tablet Take  by mouth daily. (Patient not taking: Reported on 11/10/2021)    lurasidone (LATUDA) 80 mg tab tablet Take  by mouth.  (Patient not taking: Reported on 11/10/2021)    diazePAM (VALIUM) 10 mg tablet Take 1 tab by mouth as directed by nurse prior to procedure (Patient not taking: Reported on 11/10/2021)    OXcarbazepine (TRILEPTAL) 300 mg tablet take 1 tablet by mouth every morning and 2 tablets at bedtime (Patient not taking: Reported on 11/10/2021)    BELSOMRA 15 mg tablet  (Patient not taking: Reported on 7/21/2021)    lamoTRIgine (LAMICTAL) 25 mg tablet take 1 tablet by mouth at bedtime for 2 weeks then INCREASE TO 50 MG AT BEDTIME (Patient not taking: Reported on 11/10/2021)    amLODIPine (NORVASC) 2.5 mg tablet  (Patient not taking: Reported on 11/10/2021)    LATUDA 20 mg tab tablet take 1 tablet by mouth WITH DINNER for 1 week then INCREASE TO 40 MG WITH DINNER (Patient not taking: Reported on 11/10/2021)    mirtazapine (REMERON) 15 mg tablet  (Patient not taking: Reported on 11/10/2021)    montelukast (SINGULAIR) 10 mg tablet  (Patient not taking: Reported on 11/10/2021)    QUEtiapine (SEROQUEL) 50 mg tablet take 1/2 to 1 tablet by mouth at bedtime (Patient not taking: Reported on 11/10/2021)    pregabalin (LYRICA) 75 mg capsule Take 1 Cap by mouth two (2) times a day. Max Daily Amount: 150 mg. (Patient not taking: Reported on 11/10/2021)    pregabalin (LYRICA) 50 mg capsule Take 1 Cap by mouth two (2) times a day. Max Daily Amount: 100 mg.  (Patient not taking: Reported on 11/10/2021)    PROVENTIL HFA 90 mcg/actuation inhaler INHALE 2 PUFFS PO QID PRN (Patient not taking: Reported on 11/10/2021)    allopurinol (ZYLOPRIM) 300 mg tablet TK 1 T PO QD (Patient not taking: Reported on 7/21/2021)    ALPRAZolam (XANAX) 0.5 mg tablet TAKE 1 TO 2 T PO TID PRN FOR 30 DAYS (Patient not taking: Reported on 7/21/2021)    citalopram (CELEXA) 40 mg tablet TK 1 T PO QD (Patient not taking: Reported on 7/21/2021)    clonazePAM (KLONOPIN) 1 mg tablet TK 1 T PO Q 8 H PRN (Patient not taking: Reported on 7/21/2021)    cyclobenzaprine (FLEXERIL) 10 mg tablet TK 1 T PO Q 8 H PRF BACK PAIN (Patient not taking: Reported on 11/10/2021)    diclofenac EC (VOLTAREN) 75 mg EC tablet TK 1 T PO BID (Patient not taking: Reported on 7/21/2021)    gabapentin (NEURONTIN) 300 mg capsule TK 7 CS PO DAILY FOR 30 DAYS (Patient not taking: Reported on 11/10/2021)    hydroCHLOROthiazide (HYDRODIURIL) 25 mg tablet TAKE 1 T PO QD (Patient not taking: Reported on 11/10/2021)    hydrOXYzine pamoate (VISTARIL) 25 mg capsule TK 1 C PO Q 12 H PRF ANXIETY FOR 30 DAYS (Patient not taking: Reported on 11/10/2021)    simvastatin (ZOCOR) 20 mg tablet TK 1 T PO QHS (Patient not taking: Reported on 11/10/2021)    sucralfate (CARAFATE) 1 gram tablet TAKE 1 T PO QID BEFORE MEALS AND AT BEDTIME (Patient not taking: Reported on 11/10/2021)    topiramate (TOPAMAX) 25 mg tablet 3 tabs PO QHS (Patient not taking: Reported on 11/10/2021)     No current facility-administered medications for this visit.       PHYSICAL EXAMINATION:  Visit Vitals  Pulse 70   Temp 97.5 °F (36.4 °C) (Temporal)   Ht 4' 11\" (1.499 m)   Wt 174 lb 9.6 oz (79.2 kg)   SpO2 98%   BMI 35.26 kg/m²       ORTHO EXAMINATION:  Examination Right knee Left knee   Skin Intact Intact   Range of motion 120-0 120-0   Effusion - -   Medial joint line tenderness - -   Lateral joint line tenderness + +   Popliteal tenderness - -   Osteophytes palpable - -   Randys - -   Patella crepitus - -   Anterior drawer - -   Lateral laxity - -   Medial laxity - -   Varus deformity + +   Valgus deformity - -   Pretibial edema + +   Calf tenderness - -     Examination Lumbar Thoracic   Skin Intact Intact   Tenderness + lower paralumbar -   Tightness + lower paralumbar -   Lordosis Normal N/A   Kyphosis N/A Normal   Scoliosis - -   Flexion Fingertips to mid shin N/A   Extension 10 N/A   Knee reflexes Normal N/A   Ankle reflexes Normal N/A   Straight leg raise - N/A   Calf tenderness - N/A        TIME OUT:  Chart reviewed for the following:   IDu MD, have reviewed the History, Physical and updated the Allergic reactions for Gabriella Lyn   TIME OUT performed immediately prior to start of procedure:  Sepideh Brennan MD, have performed the following reviews on Gabriella Lyn prior to the start of the procedure:          * Patient was identified by name and date of birth   * Agreement on procedure being performed was verified  * Risks and Benefits explained to the patient  * Procedure site verified and marked as necessary  * Patient was positioned for comfort  * Consent was obtained     Time: 12:22 PM     Date of procedure: 11/10/2021  Procedure performed by:  Patricia Frazier MD  Ms. Morataya tolerated the procedure well with no complications. RADIOGRAPHS:  XR BILAT KNEE 3/4/20 GILMER  -I have independently reviewed these images during this office visit. -Dr. Tracy Nolan:  Three views - No fractures, no effusion, severe medial joint space narrowing, + osteophytes present. Kellgren Elmer grade 4. IMPRESSION:      ICD-10-CM ICD-9-CM    1. Primary osteoarthritis of left knee  M17.12 715.16 hylan g-f 20 (SYNVISC) injection 8 mg      SD DRAIN/INJECT LARGE JOINT/BURSA   2. Chronic pain of left knee  M25.562 719.46 hylan g-f 20 (SYNVISC) injection 8 mg    G89.29 338.29 [place] DRAIN/INJECT LARGE JOINT/BURSA   3. Primary osteoarthritis of right knee  M17.11 715.16 hylan g-f 20 (SYNVISC) injection 8 mg      SD DRAIN/INJECT LARGE JOINT/BURSA   4. Chronic pain of right knee  M25.561 719.46 hylan g-f 20 (SYNVISC) injection 8 mg    G89.29 338.29 SD DRAIN/INJECT LARGE JOINT/BURSA   5. Lumbar pain  M54.50 724.2 betamethasone (CELESTONE) injection 3 mg      REFERRAL TO SPINE SURGERY   6. Lumbar radiculopathy  M54.16 724.4 betamethasone (CELESTONE) injection 3 mg      REFERRAL TO SPINE SURGERY     PLAN:  After discussing treatment options, patient's knees were injected with 2 cc Synvisc and her paralumbar region was injected with 4 cc Marcaine and 1/2 cc Celestone. There is no need for surgery. She will follow up in 1 week for Synvisc #2. She will follow up at the spine center.        Scribed by Patricia Frazier MD Chryl Spina) as dictated by Patricia Frazier MD

## 2021-11-17 ENCOUNTER — OFFICE VISIT (OUTPATIENT)
Dept: ORTHOPEDIC SURGERY | Age: 56
End: 2021-11-17
Payer: MEDICARE

## 2021-11-17 VITALS — TEMPERATURE: 97.7 F | OXYGEN SATURATION: 98 % | BODY MASS INDEX: 35.95 KG/M2 | WEIGHT: 178 LBS | HEART RATE: 96 BPM

## 2021-11-17 DIAGNOSIS — M17.12 PRIMARY OSTEOARTHRITIS OF LEFT KNEE: Primary | ICD-10-CM

## 2021-11-17 DIAGNOSIS — M25.562 CHRONIC PAIN OF LEFT KNEE: ICD-10-CM

## 2021-11-17 DIAGNOSIS — M25.561 CHRONIC PAIN OF RIGHT KNEE: ICD-10-CM

## 2021-11-17 DIAGNOSIS — M17.11 PRIMARY OSTEOARTHRITIS OF RIGHT KNEE: ICD-10-CM

## 2021-11-17 DIAGNOSIS — G89.29 CHRONIC PAIN OF LEFT KNEE: ICD-10-CM

## 2021-11-17 DIAGNOSIS — G89.29 CHRONIC PAIN OF RIGHT KNEE: ICD-10-CM

## 2021-11-17 PROCEDURE — 20610 DRAIN/INJ JOINT/BURSA W/O US: CPT | Performed by: SPECIALIST

## 2021-11-17 NOTE — PROGRESS NOTES
Patient: Ángela Singleton                MRN: [id number]       SSN: [id number]  YOB: 1965        AGE: 61 y.o. SEX: female  Body mass index is 35.95 kg/m². PCP: Mynor Vázquez MD  11/17/21    CC: BILATERAL KNEE    HISTORY:  Ángela Singleton is a 61 y.o. female who is seen for bilateral knee pain. ICD-10-CM ICD-9-CM    1. Primary osteoarthritis of left knee  M17.12 715.16 hylan g-f 20 (SYNVISC) injection 8 mg      [place] DRAIN/INJECT LARGE JOINT/BURSA   2. Chronic pain of left knee  M25.562 719.46 hylan g-f 20 (SYNVISC) injection 8 mg    G89.29 338.29 [place] DRAIN/INJECT LARGE JOINT/BURSA   3. Primary osteoarthritis of right knee  M17.11 715.16 hylan g-f 20 (SYNVISC) injection 8 mg      [place] DRAIN/INJECT LARGE JOINT/BURSA   4. Chronic pain of right knee  M25.561 719.46 hylan g-f 20 (SYNVISC) injection 8 mg    G89.29 338.29 [place] DRAIN/INJECT LARGE JOINT/BURSA       Chart reviewed for the following:   Kike Philippe MD, have reviewed the History, Physical and updated the Allergic reactions for [address] performed immediately prior to start of procedure:  Kike Philippe MD, have performed the following reviews on Ángela Singleton prior to the start of the procedure:            * Patient was identified by name and date of birth   * Agreement on procedure being performed was verified  * Risks and Benefits explained to the patient  * Procedure site verified and marked as necessary  * Patient was positioned for comfort  * Consent was obtained     Time: 12:15 PM     Date of procedure: 11/17/2021    Procedure performed by:  Harvey José MD    Ms. Morataya tolerated the procedure well with no complications. PLAN:  After discussing treatment options, patient's knees were injected with 2 cc of Synvisc. Ms. Yeyo Payne will follow up in one week to complete her visco supplementation injection series.       Scribed by Harvey José MD ([address]) as dictated by David Martinez Lia Barboza MD

## 2021-12-15 ENCOUNTER — OFFICE VISIT (OUTPATIENT)
Dept: ORTHOPEDIC SURGERY | Age: 56
End: 2021-12-15
Payer: MEDICARE

## 2021-12-15 VITALS
TEMPERATURE: 97.5 F | WEIGHT: 181 LBS | OXYGEN SATURATION: 99 % | HEIGHT: 59 IN | BODY MASS INDEX: 36.49 KG/M2 | HEART RATE: 77 BPM

## 2021-12-15 DIAGNOSIS — M25.561 CHRONIC PAIN OF RIGHT KNEE: ICD-10-CM

## 2021-12-15 DIAGNOSIS — G89.29 CHRONIC PAIN OF LEFT KNEE: ICD-10-CM

## 2021-12-15 DIAGNOSIS — G89.29 CHRONIC PAIN OF RIGHT KNEE: ICD-10-CM

## 2021-12-15 DIAGNOSIS — M17.12 PRIMARY OSTEOARTHRITIS OF LEFT KNEE: Primary | ICD-10-CM

## 2021-12-15 DIAGNOSIS — M17.11 PRIMARY OSTEOARTHRITIS OF RIGHT KNEE: ICD-10-CM

## 2021-12-15 DIAGNOSIS — M25.562 CHRONIC PAIN OF LEFT KNEE: ICD-10-CM

## 2021-12-15 PROCEDURE — 20610 DRAIN/INJ JOINT/BURSA W/O US: CPT | Performed by: SPECIALIST

## 2021-12-15 RX ORDER — GABAPENTIN 100 MG/1
CAPSULE ORAL 3 TIMES DAILY
COMMUNITY

## 2021-12-15 RX ORDER — BREXPIPRAZOLE 1 MG/1
TABLET ORAL
COMMUNITY
Start: 2021-11-18

## 2021-12-15 NOTE — PROGRESS NOTES
Patient: Alena Khoury                MRN: [id number]       SSN: [id number]  YOB: 1965        AGE: 61 y.o. SEX: female  Body mass index is 36.56 kg/m². PCP: Lorne Coreas MD  12/15/21    Chief Complaint   Patient presents with    Knee Pain     both knees L>R     HISTORY:  Alena Khoury is a 61 y.o. female who is seen for bilateral knee pain. TIME OUT performed immediately prior to start of procedure:  Dewayne Valle MD, have performed the following reviews on Alena Khoury prior to the start of the procedure:            * Patient was identified by name and date of birth   * Agreement on procedure being performed was verified  * Risks and Benefits explained to the patient  * Procedure site verified and marked as necessary  * Patient was positioned for comfort  * Consent was obtained     Time: 10:56 AM     Date of procedure: 12/15/2021    Procedure performed by:  Ventura Kawasaki, MD    Ms. Morataya tolerated the procedure well with no complications      [id number] ICD-9-CM    1. Primary osteoarthritis of left knee  M17.12 715.16 hylan g-f 20 (SYNVISC) injection 8 mg      [place] DRAIN/INJECT LARGE JOINT/BURSA   2. Chronic pain of left knee  M25.562 719.46 hylan g-f 20 (SYNVISC) injection 8 mg    G89.29 338.29 [place] DRAIN/INJECT LARGE JOINT/BURSA   3. Primary osteoarthritis of right knee  M17.11 715.16 hylan g-f 20 (SYNVISC) injection 8 mg      [place] DRAIN/INJECT LARGE JOINT/BURSA   4. Chronic pain of right knee  M25.561 719.46 hylan g-f 20 (SYNVISC) injection 8 mg    G89.29 338.29 [place] DRAIN/INJECT LARGE JOINT/BURSA     PLAN:  After discussing treatment options, patient's knees were injected with 2 cc of Synvisc. Ms. Falguni Vega will follow up PRN now that she has completed her visco supplementation injection series.       Scribed by Ventura Kawasaki, MD Louie Gallery) as dictated by Ventura Kawasaki, MD

## 2022-03-16 ENCOUNTER — OFFICE VISIT (OUTPATIENT)
Dept: ORTHOPEDIC SURGERY | Age: 57
End: 2022-03-16
Payer: MEDICARE

## 2022-03-16 VITALS
OXYGEN SATURATION: 99 % | WEIGHT: 176.2 LBS | TEMPERATURE: 97.8 F | HEIGHT: 59 IN | BODY MASS INDEX: 35.52 KG/M2 | HEART RATE: 75 BPM

## 2022-03-16 DIAGNOSIS — M25.562 CHRONIC PAIN OF LEFT KNEE: ICD-10-CM

## 2022-03-16 DIAGNOSIS — M17.12 PRIMARY OSTEOARTHRITIS OF LEFT KNEE: Primary | ICD-10-CM

## 2022-03-16 DIAGNOSIS — M25.561 CHRONIC PAIN OF RIGHT KNEE: ICD-10-CM

## 2022-03-16 DIAGNOSIS — G89.29 CHRONIC PAIN OF RIGHT KNEE: ICD-10-CM

## 2022-03-16 DIAGNOSIS — G89.29 CHRONIC PAIN OF LEFT KNEE: ICD-10-CM

## 2022-03-16 DIAGNOSIS — M17.11 PRIMARY OSTEOARTHRITIS OF RIGHT KNEE: ICD-10-CM

## 2022-03-16 PROCEDURE — 20610 DRAIN/INJ JOINT/BURSA W/O US: CPT | Performed by: SPECIALIST

## 2022-03-16 RX ORDER — BUDESONIDE, GLYCOPYRROLATE, AND FORMOTEROL FUMARATE 160; 9; 4.8 UG/1; UG/1; UG/1
AEROSOL, METERED RESPIRATORY (INHALATION) 2 TIMES DAILY
COMMUNITY

## 2022-03-16 RX ORDER — BETAMETHASONE SODIUM PHOSPHATE AND BETAMETHASONE ACETATE 3; 3 MG/ML; MG/ML
6 INJECTION, SUSPENSION INTRA-ARTICULAR; INTRALESIONAL; INTRAMUSCULAR; SOFT TISSUE ONCE
Status: COMPLETED | OUTPATIENT
Start: 2022-03-16 | End: 2022-03-16

## 2022-03-16 RX ADMIN — BETAMETHASONE SODIUM PHOSPHATE AND BETAMETHASONE ACETATE 6 MG: 3; 3 INJECTION, SUSPENSION INTRA-ARTICULAR; INTRALESIONAL; INTRAMUSCULAR; SOFT TISSUE at 11:49

## 2022-03-16 NOTE — PROGRESS NOTES
Patient: Kofi Garcia                MRN: [id number]       SSN: [id number]  YOB: 1965        AGE: 61 y.o. SEX: female    PCP: Montgomery Osgood, MD  22    CC: BILATERAL KNEE PAIN    HISTORY:  Kofi Garcia is a 61 y.o. female who is seen for increased bilateral knee pain L>R. She responded temporarily to a Synvisc series on 12/15/21 but her pains have returned. She has been experiencing bilateral anterior knee pain for the past several months. She does not recall any injury. She feels pain with standing, walking and stair climbing. She experiences startup pain after sitting and waking up in the morning. She even has difficulty doing her grocery shopping. She states that her knees squeak all the time. She has been treated by Dr. Katina Hua and RAZA Allen in the past.  She has responded to past cortisone & visco supplementation injections. She was previously seen for back pain. She has been treated by Dr. Kenya Blanchard for her back pain. She says her knee pain exacerbates her back pain. She feels back pain that radiates to her buttocks and down her legs. She states that Dr. Francisca Leija said that there's nothing more he can do for her. She says she has been experiencing terrible leg and foot cramps lately. She is seeing her PCP soon. She has a h/o a stroke a few years ago that affected her left side. She is doing better but still feels weak. Pain Assessment  3/16/2022   Location of Pain Knee   Location Modifiers Left;Right   Severity of Pain 9   Quality of Pain Throbbing   Quality of Pain Comment -   Duration of Pain -   Frequency of Pain Constant   Aggravating Factors Standing;Walking   Aggravating Factors Comment -   Limiting Behavior Yes   Relieving Factors Nothing   Relieving Factors Comment -   Result of Injury No     Occupation, etc:  Ms. Madeline Moreno receives social security disability benefits for chronic back pain.  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 She started to receive disability 5 years ago. She lives with her 28 yo daughter and 46 yo son in [place]. She has another daughter and 13 grandchildren. Her first great grandson was born in March. She has severe anxiety for which she takes medication. She is not diabetic. She is hypertensive. She has COPD. She does not have a vehicle and needs people to drive her to her appointments. Ms. Remy Barrett weighs 176 lbs and is 4'11\" tall. She is needle phobic. She has been vaccinated for Covid-19.      No results found for: HBA1C, LMH7OUOC, UVN1ANIF, MAY5LKHN  Weight Metrics 3/16/2022 12/15/2021 11/17/2021 11/10/2021 10/6/2021 7/21/2021 11/11/2020   Weight 176 lb 3.2 oz 181 lb 178 lb 174 lb 9.6 oz 178 lb 172 lb 163 lb   BMI 35.59 kg/m2 36.56 kg/m2 35.95 kg/m2 35.26 kg/m2 35.95 kg/m2 34.74 kg/m2 32.92 kg/m2       Patient Active Problem List   Diagnosis Code    Lumbar herniated disc M51.26    Lumbar neuritis M54.16    Other intervertebral disc degeneration, lumbar region M51.36    Severe obesity (HCC) E66.01     REVIEW OF SYSTEMS:    Constitutional Symptoms: Negative   Eyes: Negative   Ears, Nose, Throat and Mouth: Negative   Cardiovascular: Negative   Respiratory: Negative   Genitourinary: Per HPI   Gastrointestinal: Per HPI   Integumentary (Skin and/or Breast): Negative   Musculoskeletal: Per HPI   Endocrine/Rheumatologic: Negative   Neurological: Per HPI   Hematology/Lymphatic: Negative    Allergic/Immunologic: Negative   Phychiatric: Negative    Social History     Socioeconomic History    Marital status: LEGALLY      Spouse name: Not on file    Number of children: Not on file    Years of education: Not on file    Highest education level: Not on file   Occupational History    Not on file   Tobacco Use    Smoking status: Former Smoker     Packs/day: 1.00    Smokeless tobacco: Never Used   Vaping Use    Vaping Use: Never used   Substance and Sexual Activity    Alcohol use: No    Drug use: Not on file    Sexual activity: Not on file   Other Topics Concern     Service Not Asked    Blood Transfusions Not Asked    Caffeine Concern Not Asked    Occupational Exposure Not Asked    Hobby Hazards Not Asked    Sleep Concern Not Asked    Stress Concern Not Asked    Weight Concern Not Asked    Special Diet Not Asked    Back Care Not Asked    Exercise Not Asked    Bike Helmet Not Asked   [address] Not Asked    Self-Exams Not Asked   Social History Narrative    Not on file     Social Determinants of Health     Financial Resource Strain:     Difficulty of Paying Living Expenses: Not on file   Food Insecurity:     Worried About Running Out of Food in the Last Year: Not on file    Danilo of Food in the Last Year: Not on file   Transportation Needs:     Lack of Transportation (Medical): Not on file    Lack of Transportation (Non-Medical):  Not on file   Physical Activity:     Days of Exercise per Week: Not on file    Minutes of Exercise per Session: Not on file   Stress:     Feeling of Stress : Not on file   Social Connections:     Frequency of Communication with Friends and Family: Not on file    Frequency of Social Gatherings with Friends and Family: Not on file    Attends Scientology Services: Not on file    Active Member of [address] [place] [address] [employer] or Organizations: Not on file    Attends Club or Organization Meetings: Not on file    Marital Status: Not on file   Intimate Partner Violence:     Fear of Current or Ex-Partner: Not on file    Emotionally Abused: Not on file    Physically Abused: Not on file    Sexually Abused: Not on file   Housing Stability:     Unable to Pay for Housing in the Last Year: Not on file    Number of [place] in the Last Year: Not on file    Unstable Housing in the Last Year: Not on file      No Known Allergies   Current Outpatient Medications   Medication Sig    budesonide-glycopyr-formoterol ([facility]) 160-9-4.8 mcg/actuation HFAA Take  by inhalation two (2) times a day.  Rexulti 1 mg tab tablet take 1/2 tablet by mouth at bedtime for 1 week then INCREASE to 1 tablet at bedtime    gabapentin (NEURONTIN) 100 mg capsule Take  by mouth three (3) times daily.  losartan (COZAAR) 50 mg tablet Take 50 mg by mouth daily.  hydroCHLOROthiazide (HYDRODIURIL) 12.5 mg tablet Take 12.5 mg by mouth daily.  omeprazole (PRILOSEC) 40 mg capsule take 1 capsule by mouth once daily 30 MINUTE before breakfast    traZODone (DESYREL) 100 mg tablet Take 100 mg by mouth nightly. Indications: Patient is taking one tab twice a day    aspirin delayed-release 81 mg tablet Take 81 mg by mouth.  atorvastatin (LIPITOR) 40 mg tablet Take 40 mg by mouth.  venlafaxine-SR (EFFEXOR-XR) 37.5 mg capsule take 1 capsule by mouth once daily (Patient not taking: Reported on 11/10/2021)    diclofenac EC (VOLTAREN) 75 mg EC tablet take 1 tablet by mouth twice a day (Patient not taking: Reported on 11/10/2021)    omeprazole (PRILOSEC) 20 mg capsule take 1 capsule by mouth AT LEAST 30 MINUTES PRIOR TO EATING DINNER (Patient not taking: Reported on 11/10/2021)    SYMBICORT 160-4.5 mcg/actuation HFAA  (Patient not taking: Reported on 3/16/2022)    ARIPiprazole (ABILIFY) 5 mg tablet  (Patient not taking: Reported on 11/10/2021)    olmesartan (BENICAR) 5 mg tablet  (Patient not taking: Reported on 11/10/2021)    traZODone (DESYREL) 150 mg tablet take 1 tablet by mouth at bedtime (Patient not taking: Reported on 11/10/2021)    diclofenac (VOLTAREN) 1 % gel Apply 2 g to affected area four (4) times daily. (Patient not taking: Reported on 11/10/2021)    lamoTRIgine (LAMICTAL) 100 mg tablet Take  by mouth daily. (Patient not taking: Reported on 11/10/2021)    lurasidone (LATUDA) 80 mg tab tablet Take  by mouth.  (Patient not taking: Reported on 11/10/2021)    diazePAM (VALIUM) 10 mg tablet Take 1 tab by mouth as directed by nurse prior to procedure (Patient not taking: Reported on 11/10/2021)    OXcarbazepine (TRILEPTAL) 300 mg tablet take 1 tablet by mouth every morning and 2 tablets at bedtime (Patient not taking: Reported on 11/10/2021)    BELSOMRA 15 mg tablet  (Patient not taking: Reported on 7/21/2021)    lamoTRIgine (LAMICTAL) 25 mg tablet take 1 tablet by mouth at bedtime for 2 weeks then INCREASE TO 50 MG AT BEDTIME (Patient not taking: Reported on 11/10/2021)    amLODIPine (NORVASC) 2.5 mg tablet  (Patient not taking: Reported on 11/10/2021)    LATUDA 20 mg tab tablet take 1 tablet by mouth WITH DINNER for 1 week then INCREASE TO 40 MG WITH DINNER (Patient not taking: Reported on 11/10/2021)    mirtazapine (REMERON) 15 mg tablet  (Patient not taking: Reported on 11/10/2021)    montelukast (SINGULAIR) 10 mg tablet  (Patient not taking: Reported on 11/10/2021)    QUEtiapine (SEROQUEL) 50 mg tablet take 1/2 to 1 tablet by mouth at bedtime (Patient not taking: Reported on 11/10/2021)    pregabalin (LYRICA) 75 mg capsule Take 1 Cap by mouth two (2) times a day. Max Daily Amount: 150 mg. (Patient not taking: Reported on 11/10/2021)    pregabalin (LYRICA) 50 mg capsule Take 1 Cap by mouth two (2) times a day. Max Daily Amount: 100 mg.  (Patient not taking: Reported on 11/10/2021)    PROVENTIL HFA 90 mcg/actuation inhaler INHALE 2 PUFFS PO QID PRN (Patient not taking: Reported on 11/10/2021)    allopurinol (ZYLOPRIM) 300 mg tablet TK 1 T PO QD (Patient not taking: Reported on 7/21/2021)    ALPRAZolam (XANAX) 0.5 mg tablet TAKE 1 TO 2 T PO TID PRN FOR 30 DAYS (Patient not taking: Reported on 7/21/2021)    citalopram (CELEXA) 40 mg tablet TK 1 T PO QD (Patient not taking: Reported on 7/21/2021)    clonazePAM (KLONOPIN) 1 mg tablet TK 1 T PO Q 8 H PRN (Patient not taking: Reported on 7/21/2021)    cyclobenzaprine (FLEXERIL) 10 mg tablet TK 1 T PO Q 8 H PRF BACK PAIN (Patient not taking: Reported on 11/10/2021)    diclofenac EC (VOLTAREN) 75 mg EC tablet TK 1 T PO BID (Patient not taking: Reported on 7/21/2021)    gabapentin (NEURONTIN) 300 mg capsule TK 7 CS PO DAILY FOR 30 DAYS (Patient not taking: Reported on 11/10/2021)    hydroCHLOROthiazide (HYDRODIURIL) 25 mg tablet TAKE 1 T PO QD (Patient not taking: Reported on 11/10/2021)    hydrOXYzine pamoate (VISTARIL) 25 mg capsule TK 1 C PO Q 12 H PRF ANXIETY FOR 30 DAYS (Patient not taking: Reported on 11/10/2021)    simvastatin (ZOCOR) 20 mg tablet TK 1 T PO QHS (Patient not taking: Reported on 11/10/2021)    sucralfate (CARAFATE) 1 gram tablet TAKE 1 T PO QID BEFORE MEALS AND AT BEDTIME (Patient not taking: Reported on 11/10/2021)    topiramate (TOPAMAX) 25 mg tablet 3 tabs PO QHS (Patient not taking: Reported on 11/10/2021)     No current facility-administered medications for this visit.       PHYSICAL EXAMINATION:  Visit Vitals  Pulse 75   Temp 97.8 °F (36.6 °C) (Temporal)   Ht 4' 11\" (1.499 m)   Wt 176 lb 3.2 oz (79.9 kg)   SpO2 99%   BMI 35.59 kg/m²       ORTHO EXAMINATION:  Examination Right knee Left knee   Skin Intact Intact   Range of motion 120-0 120-0   Effusion - -   Medial joint line tenderness - -   Lateral joint line tenderness + +   Popliteal tenderness - -   Osteophytes palpable - -   Randys - -   Patella crepitus - -   Anterior drawer - -   Lateral laxity - -   Medial laxity - -   Varus deformity + +   Valgus deformity - -   Pretibial edema + +   Calf tenderness - -      TIME OUT:  Chart reviewed for the following:   IGabriel MD, have reviewed the History, Physical and updated the Allergic reactions for Alison Kalincher   TIME OUT performed immediately prior to start of procedure:  Can Barboza MD, have performed the following reviews on Alison Kalincher prior to the start of the procedure:          * Patient was identified by name and date of birth   * Agreement on procedure being performed was verified  * Risks and Benefits explained to the patient  * Procedure site verified and marked as necessary  * Patient was positioned for comfort  * Consent was obtained     Time: 11:42 AM     Date of procedure: 3/16/2022  Procedure performed by:  Camelia Thornton MD  Ms. Morataya tolerated the procedure well with no complications. RADIOGRAPHS:  XR BILAT KNEE 3/4/20 GILMER  -I have independently reviewed these images during this office visit. -Dr. Romana Ben:  Three views - No fractures, no effusion, severe medial joint space narrowing, + osteophytes present. Kellgren Elmer grade 4. IMPRESSION:      ICD-10-CM ICD-9-CM    1. Primary osteoarthritis of left knee  M17.12 715.16 betamethasone (CELESTONE) injection 6 mg      HI DRAIN/INJECT LARGE JOINT/BURSA      REFERRAL TO PHYSICAL THERAPY   2. Chronic pain of left knee  M25.562 719.46 betamethasone (CELESTONE) injection 6 mg    G89.29 338.29 HI DRAIN/INJECT LARGE JOINT/BURSA      REFERRAL TO PHYSICAL THERAPY   3. Primary osteoarthritis of right knee  M17.11 715.16 betamethasone (CELESTONE) injection 6 mg      HI DRAIN/INJECT LARGE JOINT/BURSA      REFERRAL TO PHYSICAL THERAPY   4. Chronic pain of right knee  M25.561 719.46 betamethasone (CELESTONE) injection 6 mg    G89.29 338.29 HI DRAIN/INJECT LARGE JOINT/BURSA      REFERRAL TO PHYSICAL THERAPY     PLAN: She will start a brief course of outpatient physical therapy. After discussing treatment options, patient's knees were injected with 4 cc Marcaine and 1/2 cc Celestone. We discussed possible need for a total knee arthroplasty at some time in the future if pain continues. She will follow up as needed.       Scribed by Camelia Thornton MD [facility]) as dictated by Camelia Thornton MD

## 2022-03-25 ENCOUNTER — APPOINTMENT (OUTPATIENT)
Dept: PHYSICAL THERAPY | Age: 57
End: 2022-03-25
Attending: SPECIALIST

## 2022-03-30 ENCOUNTER — APPOINTMENT (OUTPATIENT)
Dept: PHYSICAL THERAPY | Age: 57
End: 2022-03-30
Attending: SPECIALIST

## 2022-04-12 ENCOUNTER — APPOINTMENT (OUTPATIENT)
Dept: PHYSICAL THERAPY | Age: 57
End: 2022-04-12
Attending: SPECIALIST

## 2022-04-25 ENCOUNTER — HOSPITAL ENCOUNTER (OUTPATIENT)
Dept: PHYSICAL THERAPY | Age: 57
Discharge: HOME | End: 2022-04-25
Attending: SPECIALIST
Payer: MEDICARE

## 2022-04-25 PROCEDURE — 97162 PT EVAL MOD COMPLEX 30 MIN: CPT | Performed by: PHYSICAL THERAPIST

## 2022-04-25 NOTE — PROGRESS NOTES
PT DAILY TREATMENT NOTE/KNEE EVAL     Patient Name: Vidhi Cedillo  Date:2022  : 1965  [x]  Patient  Verified  Payor: BLUE CROSS MEDICARE / Plan: [place] BLUE [place]OSS MEDIC[place] PPO / Product Type: Managed Care Medicare /    In time::  Out time:12:12  Total Treatment Time (min): 46  Visit #: 1 of 8    Medicare/BCBS Only   Total Timed Codes (min):  23 1:1 Treatment Time:  46     Treatment Area: Left knee pain [M25.562]  Pain in right knee [M25.561]    SUBJECTIVE  Pain Level (0-10 scale): 8/10 now in sitting; 4/10 at best; 10/10 at worst.  [x]constant []intermittent []improving [x]worsening []no change since onset    Any medication changes, allergies to medications, adverse drug reactions, diagnosis change, or new procedure performed?: [x] No    [] Yes (see summary sheet for update)  Subjective functional status/changes:     PLOF: Has been on disability for back pain for 5 years. Had knee pain even prior to that. Limitations to PLOF: Limited ability to do her household chores. Does steps one at a time. Mechanism of Injury: Patient denies injury or trauma, just gradual onset pain due to arthritis. Current symptoms/Complaints: Constant pain in both knees. Has tried cortisone and gel injections without relief. States she was told she needs ESTELA but due to her COPD, that is not an option at this time. Limited ambulation, (+) shopping cart sign, pain with prolonged standing. Previous Treatment/Compliance: Injections  PMHx/Surgical Hx: No h/o knee surgery. Back pain, anxiety, obesity. Work Hx: Disabled due to back pain. Living Situation: Has steps to go up. Pt Goals: \"No pain\"  Barriers: [x]pain []financial []time []transportation [x]other: possible unrealistic expectation regarding elimination of pain  Cognition: A & O x 3    Other:    OBJECTIVE/EXAMINATION  Domestic Life: Lives with her family. Activity/Recreational Limitations: Limited ambulation, limited on stairs.   Mobility: ambulates FWB, no AD  Self Care: Independent. 23 min [x]Eval                  []Re-Eval       23 min Therapeutic Exercise:  [] See flow sheet :   Rationale: increase ROM, increase strength and improve coordination to improve the patients ability to increase her functional activity level. With   [] TE   [] TA   [] neuro   [] other: Patient Education: [x] Review HEP    [] Progressed/Changed HEP based on:   [] positioning   [] body mechanics   [] transfers   [] heat/ice application    [] other:      Other Objective/Functional Measures:     Physical Therapy Evaluation - Knee    Posture: [x] Varus    [] Valgus    [] Recurvatum        [] Tibial Torsion    [] Foot Supination    [] Foot Pronation    Describe:    Gait:  [] Normal    [] Abnormal    [x] Antalgic    [] NWB    Device: None    Describe: WBOS, decreased lázaro, decreased step length.     ROM / Strength  [] Unable to assess                  AROM                      PROM                   Strength (1-5)    Left Right Left Right Left Right   Hip Flexion WNL WNL   3 3    Extension To neutral To neutral        Abduction WNL WNL   3 3    Adduction WNL WNL       Knee Flexion 105 104 110 113 3 3    Extension 0 17 0 0 3 3                       There is tibial medial rotation with passive extension of the right knee which is painful    Flexibility: [] Unable to assess at this time  Hamstrings:    (L) Tightness= [] WNL   [x] Min   [] Mod   [] Severe    (R) Tightness= [] WNL   [x] Min   [] Mod   [] Severe  Quadriceps:    (L) Tightness= [] WNL   [] Min   [x] Mod   [] Severe    (R) Tightness= [] WNL   [] Min   [x] Mod   [] Severe      Palpation:  Left Knee   Neg/Pos  Neg/Pos  Neg/Pos   Joint Line(Med/Lat) (+)/(+) Quad tendon (+) Patellar Tendon (+)   Patella (+) Gastrocnemius(Med/Lat) (-) Pes Anserinus (+)   Popliteal Fossa (-) Hamstring tendons(Med/Lat) (-) MCL/LCL (-)   Right Knee    Neg/Pos  Neg/Pos  Neg/Pos   Joint Line(Med/Lat) (+)/(+) Quad tendon (-) Patellar Tendon (+) Patella (+) Gastrocnemius(Med/Lat) (-) Pes Anserinus (-)   Popliteal Fossa (+) Hamstring tendons(Med/Lat) (+) MCL/LCL MCL (+)       Optional Tests:  Patellar Positioning (Static)   []L []R Normal [x]L [x]R Lateral   []L []R [place]      []L []R Medial   []L []R [place]    Patellar Tracking   []L []R Glide (Lat)   [x]L [x]R Tilt (Lat)     []L []R Glide (Med)  []L []R Tilt (Med)      []L []R Tile (Inf)     Patellar Mobility   []L []R Hypermobile [x]L [x]R Hypomobile         Girth Measurements:     Cm at midpatella     Cm at   Cm below joint line  Cm at joint line   Left 41.0       Right  40.2         Left Knee  [place]achmans  [x] Neg    [] Pos   Valgus@ 0 Degrees [x] Neg    [] Pos   Valgus@ 30 Degrees [x] Neg    [] Pos   Varus@ 0 Degrees [x] Neg    [] Pos   Varus@ 30 Degrees [] Neg    [x] Pos   Ely's Test  [] Neg    [x] Pos  Patellar Compression [x] Neg    [] Pos   Jackie's Test  [] Neg    [x] Pos  Anterior Drawer [] Neg    [] Pos        Right Knee  [place]  [x] Neg    [] Pos   Valgus@ 0 Degrees [x] Neg    [] Pos   Valgus@ 30 Degrees [] Neg    [x] Pos laxity   Varus@ 0 Degrees [x] Neg    [] Pos   Varus@ 30 Degrees [x] Neg    [] Pos   Ely's Test  [] Neg    [x] Pos  Patellar Compression [x] Neg    [] Pos   Jackie's Test  [] Neg    [x] Pos  Anterior Drawer [] Neg    [] Pos  NOTE:  C/o pain with all varus and valgus testing. Other tests/comments:  RADIOGRAPHS:  XR BILAT KNEE 3/4/20 GILMER  -I have independently reviewed these images during this office visit. -Dr. Yulissa Harvey:  Three views - No fractures, no effusion, severe medial joint space narrowing, + osteophytes present. Kellgren Elmer grade 4. Pain Level (0-10 scale) post treatment: 9    ASSESSMENT/Changes in Function: Patient with signs and symptoms consistent with bilateral knee pain affecting her abiity to perform her normal ADLs. She has pain with ambulation and at rest.  Pain with stairs which she had to do daily.   She notes she needs a shopping cart to lean on doing grocery shopping. She has an antalgic gait and there is diffuse tenderness to palpation about both right and left knees. AROM is limited and painful. MMT demonstrates bilateral hip and knee weakness.       Patient will continue to benefit from skilled PT services to modify and progress therapeutic interventions, address functional mobility deficits, address ROM deficits, address strength deficits, analyze and address soft tissue restrictions, analyze and cue movement patterns and instruct in home and community integration to attain remaining goals. [x]  See Plan of Care  []  See progress note/recertification  []  See Discharge Summary         Progress towards goals / Updated goals:  Short Term Goals: To be accomplished in 1-2 treatments:  1. Patient will become proficient in their HEP and will be compliant in performing that program.  Evaluation:   Patient given a written/illustrated HEP.     Long Term Goals: To be accomplished in 10 treatments:  1. Patient's pain level will be 4-5/40 with activity in order to improve patient's ability to perform normal ADLs. Evaluation:  4/10-10/10  2. Patient will demonstrate 0-115 degrees AROM bilateral knees to increase ease of ADLs. Evaluation:  Right knee 0-104; Left knee 0-105  3. Patient will increase FOTO score to 44 to indicate increased functional mobility. Evaluation:  35  4. Patient will ascend and descend steps with reciprocal pattern to increase ease of movement in her home. Evaluation:  Doing steps one at a time.     PLAN  [x]  Upgrade activities as tolerated     [x]  Continue plan of care  []  Update interventions per flow sheet       []  Discharge due to:_  []  Other:_      Yokasta Looney, PT 4/25/2022  9:52 AM

## 2022-04-25 NOTE — PROGRESS NOTES
[facility]              [address] [facility]        [place], [address]  [phone] fax    Plan of Care/ Statement of Necessity for Physical Therapy Services    Patient name: Rachel Rordiguez Start of Care: 2022   Referral source: Sal Aguilar MD : 1965    Medical Diagnosis: Left knee pain [M25.562]  Pain in right knee [M25.561]  Payor: BLUE CROSS MEDICARE / Plan: [place] / Product Type: Managed Care Medicare /  Onset Date:Chronic, worse since 2021. Treatment Diagnosis: Left knee pain; Right knee pain   Prior Hospitalization: see medical history Provider#: [id number]   Medications: Verified on Patient summary List    Comorbidities: Anxiety/Panic Disorder, Arthritis, Asthma, Back Pain, BMI 34.3, COPD, Depression, GI Disease, HBP, Sleep Dysfunction, Stroke/TIA. Prior Level of Function: Has been on disability for back pain for 5 years. Had knee pain even prior to that. The Plan of Care and following information is based on the information from the initial evaluation. Assessment/ key information: Patient with signs and symptoms consistent with bilateral knee pain affecting her abiity to perform her normal ADLs. She has pain with ambulation and at rest.  Pain with stairs which she had to do daily. She notes she needs a shopping cart to lean on doing grocery shopping. She has an antalgic gait and there is diffuse tenderness to palpation about both right and left knees. AROM is limited and painful. MMT demonstrates bilateral hip and knee weakness. Patient will benefit from a program of skilled physical therapy to include therapeutic exercises to address strength deficits, therapeutic activities to improve functional mobility, neuromuscular reeducation to address balance, coordination and proprioception, manual therapy to address ROM and tissue extensibility and modalities as indicated.   All questions were answered. Evaluation Complexity History HIGH Complexity :3+ comorbidities / personal factors will impact the outcome/ POC ; Examination MEDIUM Complexity : 3 Standardized tests and measures addressing body structure, function, activity limitation and / or participation in recreation  ;Presentation MEDIUM Complexity : Evolving with changing characteristics  ; Clinical Decision Making MEDIUM Complexity : FOTO score of 26-74  Overall Complexity Rating: MEDIUM  Problem List: pain affecting function, decrease ROM, decrease strength, impaired gait/ balance, decrease ADL/ functional abilitiies, decrease activity tolerance and decrease flexibility/ joint mobility   Treatment Plan may include any combination of the following: Therapeutic exercise, Therapeutic activities, Neuromuscular re-education, Physical agent/modality and Manual therapy  Patient / Family readiness to learn indicated by: asking questions, trying to perform skills and interest  Persons(s) to be included in education: patient (P)  Barriers to Learning/Limitations: None  Patient Goal (s): No Pain  Patient Self Reported Health Status: fair  Rehabilitation Potential: fair    Short Term Goals: To be accomplished in 1-2 treatments:  1. Patient will become proficient in their HEP and will be compliant in performing that program.  Evaluation:   Patient given a written/illustrated HEP. Long Term Goals: To be accomplished in 10 treatments:  1. Patient's pain level will be 4-5/40 with activity in order to improve patient's ability to perform normal ADLs. Evaluation:  4/10-10/10  2. Patient will demonstrate 0-115 degrees AROM bilateral knees to increase ease of ADLs. Evaluation:  Right knee 0-104; Left knee 0-105  3. Patient will increase FOTO score to 44 to indicate increased functional mobility. Evaluation:  35  4. Patient will ascend and descend steps with reciprocal pattern to increase ease of movement in her home.   Evaluation:  Doing steps one at a time. Frequency / Duration: Patient to be seen 2 times per week for 10 treatments. Patient/ Caregiver education and instruction: Diagnosis, prognosis, exercises   [x]  Plan of care has been reviewed with PTA      Certification Period: 4/25/2022 - 5/24/2022  Alyssa Richardson, PT 4/25/2022 7:15 AM  ________________________________________________________________________    I certify that the above Therapy Services are being furnished while the patient is under my care. I agree with the treatment plan and certify that this therapy is necessary.     [de-identified] Signature:____________Date:_________TIME:________     Glo Null MD  ** Signature, Date and Time must be completed for valid certification **  Please sign and return to [facility] [address] [place], [address] [place]   [phone] fax

## 2022-04-26 ENCOUNTER — TELEPHONE (OUTPATIENT)
Dept: PHYSICAL THERAPY | Age: 57
End: 2022-04-26

## 2022-05-11 ENCOUNTER — HOSPITAL ENCOUNTER (OUTPATIENT)
Dept: PHYSICAL THERAPY | Age: 57
Discharge: HOME | End: 2022-05-11
Attending: SPECIALIST
Payer: MEDICARE

## 2022-05-11 PROCEDURE — 97110 THERAPEUTIC EXERCISES: CPT

## 2022-05-11 NOTE — PROGRESS NOTES
PT DAILY TREATMENT NOTE     Patient Name: Rachel Rodriguez  Date:2022  : 1965  [x]  Patient  Verified  Payor: BLUE CROSS MEDICARE / Plan: [place]PO / Product Type: Managed Care Medicare /    In time:9:28  Out time:9:55  Total Treatment Time (min): 27  Visit #: 2 of 8    Medicare/BCBS Only   Total Timed Codes (min):  27 1:1 Treatment Time:  27       Treatment Area: Left knee pain [M25.562]  Pain in right knee [M25.561]    SUBJECTIVE  Pain Level (0-10 scale): 8  Any medication changes, allergies to medications, adverse drug reactions, diagnosis change, or new procedure performed?: [x] No    [] Yes (see summary sheet for update)  Subjective functional status/changes:   [] No changes reported  Pt states her back is really hurting today    OBJECTIVE    27 min Therapeutic Exercise:  [x] See flow sheet :   Rationale: increase ROM and increase strength to improve the patients ability to perform ADLs           With   [] TE   [] TA   [] neuro   [] other: Patient Education: [x] Review HEP    [] Progressed/Changed HEP based on:   [] positioning   [] body mechanics   [] transfers   [] heat/ice application    [] other:      Other Objective/Functional Measures:   First f/u after Eval     Pain Level (0-10 scale) post treatment: 9    ASSESSMENT/Changes in Function: Initiated treatment program per flow sheet. Re-issued HEP as pt states she left it in the transportation vehicle. Greatly limited with treatment d/t c/o LBP, poor tolerance to seated and supine positions, improved tolerance in standing. Pt reports incr'd knee pain following treatment and states \"I don't want to unbend my knees\".     Patient will continue to benefit from skilled PT services to modify and progress therapeutic interventions, address functional mobility deficits, address ROM deficits, address strength deficits, analyze and address soft tissue restrictions, analyze and cue movement patterns, analyze and modify body mechanics/ergonomics and assess and modify postural abnormalities to attain remaining goals. []  See Plan of Care  []  See progress note/recertification  []  See Discharge Summary         Progress towards goals / Updated goals:  Short Term Goals: To be accomplished in 1-2 treatments:  1.  Patient will become proficient in their HEP and will be compliant in performing that program.  Evaluation:   Patient given a written/illustrated HEP. Current: Reissued 5/11/2022   Long Term Goals: To be accomplished in 10 treatments:  1. Patient's pain level will be 4-5/40 with activity in order to improve patient's ability to perform normal ADLs. Evaluation:  4/10-10/10  2. Patient will demonstrate 0-115 degrees AROM bilateral knees to increase ease of ADLs. Evaluation:  Right knee 0-104; Left knee 0-105  3. Patient will increase FOTO score to 44 to indicate increased functional mobility. Evaluation:  35  4. Patient will ascend and descend steps with reciprocal pattern to increase ease of movement in her home. Evaluation:  Doing steps one at a time.     PLAN  []  Upgrade activities as tolerated     [x]  Continue plan of care  []  Update interventions per flow sheet       []  Discharge due to:_  []  Other:_      Jennifer Rosado, PTA 5/11/2022  9:30 AM    Future Appointments   Date Time Provider Carmelita Garcias   5/23/2022 11:00 AM Lisa Nicole [facility]   5/25/2022 11:00 AM Jennifer Archibald [facility]

## 2022-05-23 ENCOUNTER — HOSPITAL ENCOUNTER (OUTPATIENT)
Dept: PHYSICAL THERAPY | Age: 57
Discharge: HOME | End: 2022-05-23
Attending: SPECIALIST
Payer: MEDICARE

## 2022-05-23 PROCEDURE — 97530 THERAPEUTIC ACTIVITIES: CPT

## 2022-05-23 PROCEDURE — 97110 THERAPEUTIC EXERCISES: CPT

## 2022-05-23 NOTE — PROGRESS NOTES
[facility] - [facility]              [address] [facility]        [place], [address] [place]   [phone] fax    Continued Plan of Care/ Re-certification for Physical Therapy Services    Patient name: Gio Perez Start of Care: 2022   Referral source: Bonna Sacks, MD : 1965   Medical/Treatment Diagnosis: Left knee pain [M25.562]  Pain in right knee [M25.561]  Payor: BLUE CROSS MEDICARE / Plan: [place] [employer] [place] MEDICARE PPO / Product Type: Managed Care Medicare /  Onset Date:Chronic, worse since 2021. Prior Hospitalization: see medical history Provider#: [id number]   Medications: Verified on Patient Summary List    Comorbidities: Anxiety/Panic Disorder, Arthritis, Asthma, Back Pain, BMI 34.3, COPD, Depression, GI Disease, HBP, Sleep Dysfunction, Stroke/TIA. Prior Level of Function:Has been on disability for back pain for 5 years. Had knee pain even prior to that. Visits from Start of Care: 3    Missed Visits: 0    The Plan of Care and following information is based on the patient's current status:  Goal:Patient will become proficient in their HEP and will be compliant in performing that program.  Status at last note/certification:Patient given a written/illustrated HEP  Current Status: not met    Goal:Patient's pain level will be 4-5/40 with activity in order to improve patient's ability to perform normal ADLs. Status at last note/certification:4/10-10/10  Current Status: not met    Goal:Patient will demonstrate 0-115 degrees AROM bilateral knees to increase ease of ADLs. Status at last note/certification:Right knee 0-104; Left knee 0-105  Current Status: not met    Goal:Patient will increase FOTO score to 44 to indicate increased functional mobility  Status at last note/certification:35  Current Status: not met    Goal:Patient will ascend and descend steps with reciprocal pattern to increase ease of movement in her home.   Status at last note/certification:Doing steps one at a time. Current Status: not met    Key functional changes: Increased mobility      Problems/ barriers to goal attainment: None     Problem List: pain affecting function, decrease ROM, decrease strength, decrease ADL/ functional abilitiies, decrease activity tolerance and decrease flexibility/ joint mobility    Treatment Plan: Therapeutic exercise, Therapeutic activities, Neuromuscular re-education, Patient education, Functional mobility training, Home safety training and Stair training     Patient Goal (s) has been updated and includes: \"To be able to do my daily activities with decreased pain\"     Goals for this certification period to be accomplished in 4 weeks:  Short Term Goals: To be accomplished in 1-2 treatments:  1. Patient will become proficient in their HEP and will be compliant in performing that program.  Recert: Pt inconsistent with HEP    Long Term Goals: To be accomplished in 10 treatments:  1. Patient's pain level will be 4-5/40 with activity in order to improve patient's ability to perform normal ADLs. Recert: 5/00 pain with activity  2. Patient will demonstrate 0-115 degrees AROM bilateral knees to increase ease of ADLs. Recert: Right knee 3-900; Left knee 0-100   3. Patient will increase FOTO score to 44 to indicate increased functional mobility. Recert: 38   4. Patient will ascend and descend steps with reciprocal pattern to increase ease of movement in her home. Recert: doing one step at a time    Frequency / Duration: Patient to be seen 2 times per week for 4 weeks:    Assessment / Recommendations:Pt making slow progress towards goals. Pt has attended only three therapy visits in the past four weeks. Pt reports no significant improvement since starting therapy. She has not achieved any short or long term goals but is progressing towards remaining goals. FOTO score has improved from 35 to 38 indicating improvement in function.  Pt is inconsistent with HEP and continues to have decreased (B) knee AROM. She reports no improvement in (B) knee pain and continues to be challenged with stair negotiation. Pt can benefit from additional skilled therapy to increase functional strength and mobility and decrease pain for ease of ADL's. Certification Period: 5/23/2022-6/22/2022    Irineo Rodas, PTA 5/23/2022 10:48 AM    ________________________________________________________________________  I certify that the above Therapy Services are being furnished while the patient is under my care. I agree with the treatment plan and certify that this therapy is necessary. [] I have read the above and request that my patient continue as recommended.   [] I have read the above report and request that my patient continue therapy with the following changes/special instructions: _______________________________________  [] I have read the above report and request that my patient be discharged from therapy    Physician's Signature:____________Date:_________TIME:________     Etta Sykes MD  ** Signature, Date and Time must be completed for valid certification **  Please sign and return to [facility] - [address], [address] [place]   [phone] fax

## 2022-05-23 NOTE — PROGRESS NOTES
PT DAILY TREATMENT NOTE     Patient Name: Elton Cat  Date:2022  : 1965  [x]  Patient  Verified  Payor: BLUE CROSS MEDICARE / Plan: [place]IC[place] PPO / Product Type: Managed Care Medicare /    In time:1057  Out time:1140  Total Treatment Time (min): 43  Visit #: 3 of 8    Medicare/BCBS Only   Total Timed Codes (min):  43 1:1 Treatment Time:  43       Treatment Area: Left knee pain [M25.562]  Pain in right knee [M25.561]    SUBJECTIVE  Pain Level (0-10 scale): 8/10  Any medication changes, allergies to medications, adverse drug reactions, diagnosis change, or new procedure performed?: [x] No    [] Yes (see summary sheet for update)  Subjective functional status/changes:   [] No changes reported  Pt reports pain in both knees today and states back is bothering her more today    OBJECTIVE    18 min Therapeutic Exercise:  [x] See flow sheet :   Rationale: increase ROM, increase strength, improve coordination, improve balance and increase proprioception to improve the patients ability to perform ADL's    25 min Therapeutic Activity:  [x]  See flow sheet : Reassessment, FOTO   Rationale: increase ROM, increase strength, improve coordination, improve balance and increase proprioception  to improve the patients ability to perform functional tasks with ease           With   [] TE   [] TA   [] neuro   [] other: Patient Education: [x] Review HEP    [] Progressed/Changed HEP based on:   [] positioning   [] body mechanics   [] transfers   [] heat/ice application    [] other:      Other Objective/Functional Measures:    FOTO: 38  Flexion AROM: Right knee 0-120 deg; Left knee 0-100 deg  Increased reps for standing HS curl, HR/TR  Added gastroc stretch    Pain Level (0-10 scale) post treatment: 9/10    ASSESSMENT/Changes in Function: See recert    Patient will continue to benefit from skilled PT services to modify and progress therapeutic interventions, address functional mobility deficits, address ROM deficits, address strength deficits, analyze and address soft tissue restrictions, analyze and cue movement patterns, analyze and modify body mechanics/ergonomics, assess and modify postural abnormalities and address imbalance/dizziness to attain remaining goals. []  See Plan of Care  [x]  See progress note/recertification  []  See Discharge Summary         Progress towards goals / Updated goals:  Short Term Goals: To be accomplished in 1-2 treatments:  1.  Patient will become proficient in their HEP and will be compliant in performing that program.  Evaluation:   Patient given a written/illustrated HEP. Current: Reissued 5/11/2022; NOT MET - Pt inconsistent with HEP (5/23/22)  Long Term Goals: To be accomplished in 10 treatments:  1. Patient's pain level will be 4-5/40 with activity in order to improve patient's ability to perform normal ADLs. Evaluation:  4/10-10/10  Current: NOT MET but progressing - reports 8/10 pain with activity (5/23/22)  2. Patient will demonstrate 0-115 degrees AROM bilateral knees to increase ease of ADLs. Evaluation:  Right knee 0-104; Left knee 0-105  Current: NOT MET but progresing - Right knee 0-120; Left knee 0-100 (5/23/22)  3. Patient will increase FOTO score to 44 to indicate increased functional mobility. Evaluation:  35  Current: NOT MET but progressing - FOTO: 38 (5/23/22)   4. Patient will ascend and descend steps with reciprocal pattern to increase ease of movement in her home. Evaluation:  Doing steps one at a time.   Current: NOT MET but progressing - doing one step at a time (5/23/22)    PLAN  [x]  Upgrade activities as tolerated     [x]  Continue plan of care  []  Update interventions per flow sheet       []  Discharge due to:_  []  Other:_      Graeme Dueñas, PTA 5/23/2022  10:41 AM    Future Appointments   Date Time Provider Carmelita Garcias   5/23/2022 11:00 AM RecelJeffery [facility]   5/25/2022 11:00 AM Jeffery Archibald [facility]

## 2022-05-25 ENCOUNTER — HOSPITAL ENCOUNTER (OUTPATIENT)
Dept: PHYSICAL THERAPY | Age: 57
Discharge: HOME | End: 2022-05-25
Attending: SPECIALIST
Payer: MEDICARE

## 2022-05-25 PROCEDURE — 97110 THERAPEUTIC EXERCISES: CPT

## 2022-05-25 NOTE — PROGRESS NOTES
PT DAILY TREATMENT NOTE     Patient Name: Pranay Zacarias  Date:2022  : 1965  [x]  Patient  Verified  Payor: BLUE CROSS MEDICARE / Plan: [place]IC[place] PPO / Product Type: Managed Care Medicare /    In time:1100  Out time:1141  Total Treatment Time (min): 41  Visit #: 1 of 8    Medicare/BCBS Only   Total Timed Codes (min):  41 1:1 Treatment Time:  41       Treatment Area: Left knee pain [M25.562]  Pain in right knee [M25.561]    SUBJECTIVE  Pain Level (0-10 scale): 8/10  Any medication changes, allergies to medications, adverse drug reactions, diagnosis change, or new procedure performed?: [x] No    [] Yes (see summary sheet for update)  Subjective functional status/changes:   [] No changes reported  Pt reports increased pain in both knees today. States she was very sore after last treatment visit. OBJECTIVE    41 min Therapeutic Exercise:  [x] See flow sheet :   Rationale: increase ROM, increase strength and improve coordination to improve the patients ability to perform ADL's pain free           With   [] TE   [] TA   [] neuro   [] other: Patient Education: [x] Review HEP    [] Progressed/Changed HEP based on:   [] positioning   [] body mechanics   [] transfers   [] heat/ice application    [] other:      Other Objective/Functional Measures:   Pt unable to complete bike warmup secondary to increased (B) knee pain  Pt required multiple rest breaks secondary to (B) knee pain  Added 1# to LAQ   Added Jori stretch    Pain Level (0-10 scale) post treatment: 9/10    ASSESSMENT/Changes in Function: Pt making slow progress towards goals. Pt with decreased exercise tolerance this visit secondary to increased (B) knee pain. Pt continues to be challenged with prolonged standing activities.  Will continue to progress as tolerated to increase functional strength and decrease pain for ease of ADL's    Patient will continue to benefit from skilled PT services to modify and progress therapeutic interventions, address functional mobility deficits, address ROM deficits, address strength deficits, analyze and address soft tissue restrictions, analyze and cue movement patterns, analyze and modify body mechanics/ergonomics and assess and modify postural abnormalities to attain remaining goals. []  See Plan of Care  []  See progress note/recertification  []  See Discharge Summary         Progress towards goals / Updated goals:  Short Term Goals: To be accomplished in 1-2 treatments:  1.  Patient will become proficient in their HEP and will be compliant in performing that program.  Recert: Pt inconsistent with HEP     Long Term Goals: To be accomplished in 10 treatments:  1. Patient's pain level will be 4-5/40 with activity in order to improve patient's ability to perform normal ADLs. Recert: 2/49 pain with activity  Current: No change - 8/10 pain with activity (5/25/22)  2. Patient will demonstrate 0-115 degrees AROM bilateral knees to increase ease of ADLs. Recert: Right knee 8-195; Left knee 0-100   3. Patient will increase FOTO score to 44 to indicate increased functional mobility. Recert: 16   4. Patient will ascend and descend steps with reciprocal pattern to increase ease of movement in her home.   Recert: doing one step at a time    PLAN  []  Upgrade activities as tolerated     [x]  Continue plan of care  []  Update interventions per flow sheet       []  Discharge due to:_  []  Other:_      Akira Latham PTA 5/25/2022  9:54 AM    Future Appointments   Date Time Provider Carmelita Garcias   5/25/2022 11:00 AM Allyson Payne [facility]   6/2/2022 11:00 AM Clerance Kussmaul, PTA [facility]   6/9/2022 11:00 AM Clerance Kussmaul, PTA [facility]   6/15/2022 11:00 AM [facility]   6/17/2022 11:45 AM Juvenal Yates [facility]   6/21/2022 11:00 AM Dilcia Isaacs PT [facility]   6/23/2022 11:00 AM Yamini Liu, PT [facility]

## 2022-05-31 ENCOUNTER — TELEPHONE (OUTPATIENT)
Dept: PHYSICAL THERAPY | Age: 57
End: 2022-05-31

## 2022-06-02 ENCOUNTER — APPOINTMENT (OUTPATIENT)
Dept: PHYSICAL THERAPY | Age: 57
End: 2022-06-02
Attending: SPECIALIST

## 2022-06-07 ENCOUNTER — APPOINTMENT (OUTPATIENT)
Dept: PHYSICAL THERAPY | Age: 57
End: 2022-06-07
Attending: SPECIALIST

## 2022-06-15 ENCOUNTER — APPOINTMENT (OUTPATIENT)
Dept: PHYSICAL THERAPY | Age: 57
End: 2022-06-15
Attending: SPECIALIST

## 2022-06-15 ENCOUNTER — TELEPHONE (OUTPATIENT)
Dept: PHYSICAL THERAPY | Age: 57
End: 2022-06-15

## 2022-06-17 ENCOUNTER — APPOINTMENT (OUTPATIENT)
Dept: PHYSICAL THERAPY | Age: 57
End: 2022-06-17
Attending: SPECIALIST

## 2022-06-21 ENCOUNTER — TELEPHONE (OUTPATIENT)
Dept: PHYSICAL THERAPY | Age: 57
End: 2022-06-21

## 2022-06-23 ENCOUNTER — APPOINTMENT (OUTPATIENT)
Dept: PHYSICAL THERAPY | Age: 57
End: 2022-06-23
Attending: SPECIALIST

## 2022-06-28 NOTE — THERAPY DISCHARGE
[facility]              [address]        [place], [address] [place]   [phone] fax    Discharge Summary  Patient name: Beverly Betancourt Start of Care: 2022   Referral source: Deon Carlson MD : 1965   Medical/Treatment Diagnosis: Left knee pain [M25.562]  Pain in right knee [M25.561]  Payor: BLUE CROSS MEDICARE / Plan: [place] [employer] PPO / Product Type: Managed Care Medicare /  Onset Date:Chronic, worse since 2021     Prior Hospitalization: see medical history Provider#: [id number]   Medications: Verified on Patient Summary List    Comorbidities: Anxiety/Panic Disorder, Arthritis, Asthma, Back Pain, BMI 34.3, COPD, Depression, GI Disease, HBP, Sleep Dysfunction, Stroke/TIA. Prior Level of Function:Has been on disability for back pain for 5 years.  Had knee pain even prior to that. Visits from Start of Care: 4    Missed Visits: 3  Reporting Period : 2022 to 2022    Summary of Care:  Short Term Goals: To be accomplished in 1-2 treatments:  1.  Patient will become proficient in their HEP and will be compliant in performing that program.  Recert: Pt inconsistent with HEP     Long Term Goals: To be accomplished in 10 treatments:  1. Patient's pain level will be 4-5/40 with activity in order to improve patient's ability to perform normal ADLs. Recert: 8/10 pain with activity  Current: No change - 8/10 pain with activity (22)  2. Patient will demonstrate 0-115 degrees AROM bilateral knees to increase ease of ADLs. Recert: Right knee 2-926; Left knee 0-100   3. Patient will increase FOTO score to 44 to indicate increased functional mobility. Recert: 38   4. Patient will ascend and descend steps with reciprocal pattern to increase ease of movement in her home. Recert: doing one step at a time    Patient was non compliant with her POC, unable to update goals further.     ASSESSMENT/RECOMMENDATIONS:  [x]Discontinue therapy: []Patient has reached or is progressing toward set goals      [x]Patient is non-compliant or has abdicated      []Due to lack of appreciable progress towards set goals    Lakshmi Guerrero, PT 6/28/2022 9:46 AM    NOTE TO PHYSICIAN:  Please complete the following and fax to: [facility] at [facility] at [phone]  . Retain this original for your records. If you are unable to process this request in   24 hours, please contact our office.      [] I have read the above report and request that my patient continue therapy with the following changes/special instructions:  [] I have read the above report and request that my patient be discharged from therapy    Physician's Signature:____________Date:_________TIME:________     Ailyn Sheets MD  ** Signature, Date and Time must be completed for valid certification **